# Patient Record
Sex: MALE | Race: OTHER | HISPANIC OR LATINO | URBAN - METROPOLITAN AREA
[De-identification: names, ages, dates, MRNs, and addresses within clinical notes are randomized per-mention and may not be internally consistent; named-entity substitution may affect disease eponyms.]

---

## 2022-04-25 ENCOUNTER — EMERGENCY (EMERGENCY)
Facility: HOSPITAL | Age: 52
LOS: 1 days | Discharge: HOME | End: 2022-04-25
Attending: EMERGENCY MEDICINE
Payer: MEDICAID

## 2022-04-25 VITALS
TEMPERATURE: 99 F | RESPIRATION RATE: 18 BRPM | HEART RATE: 83 BPM | WEIGHT: 159.39 LBS | OXYGEN SATURATION: 97 % | SYSTOLIC BLOOD PRESSURE: 148 MMHG | DIASTOLIC BLOOD PRESSURE: 88 MMHG

## 2022-04-25 PROCEDURE — 73700 CT LOWER EXTREMITY W/O DYE: CPT | Mod: MA

## 2022-04-25 PROCEDURE — 29515 APPLICATION SHORT LEG SPLINT: CPT | Mod: LT

## 2022-04-25 PROCEDURE — 73700 CT LOWER EXTREMITY W/O DYE: CPT | Mod: 26,LT,MA

## 2022-04-25 PROCEDURE — 73630 X-RAY EXAM OF FOOT: CPT

## 2022-04-25 PROCEDURE — 73630 X-RAY EXAM OF FOOT: CPT | Mod: 26,LT

## 2022-04-25 PROCEDURE — 99284 EMERGENCY DEPT VISIT MOD MDM: CPT

## 2022-04-25 PROCEDURE — 99284 EMERGENCY DEPT VISIT MOD MDM: CPT | Mod: 25

## 2022-04-25 RX ORDER — ACETAMINOPHEN 500 MG
975 TABLET ORAL ONCE
Refills: 0 | Status: COMPLETED | OUTPATIENT
Start: 2022-04-25 | End: 2022-04-25

## 2022-04-25 RX ADMIN — Medication 975 MILLIGRAM(S): at 17:21

## 2022-04-25 RX ADMIN — Medication 975 MILLIGRAM(S): at 16:51

## 2022-04-25 NOTE — ED PROVIDER NOTE - PATIENT PORTAL LINK FT
You can access the FollowMyHealth Patient Portal offered by [facility] by registering at the following website: [email or web address]. By joining [employer]’s FollowMyHealth portal, you will also be able to view your health information using other applications (apps) compatible with our system.

## 2022-04-25 NOTE — ED PROVIDER NOTE - NSFOLLOWUPCLINICS_GEN_ALL_ED_FT
[facility]  Podiatry/Wound Care  [address] [place] [address]  Phone: [phone]  Fax: [phone]

## 2022-04-25 NOTE — ED ADULT TRIAGE NOTE - CHIEF COMPLAINT QUOTE
Pt complain of mechanical fall at work on Saturday while going up the stairs, tripped and fell on LT ankle, swelling worsened since fall, denies head injury  Last took motrin at 1100AM today

## 2022-04-25 NOTE — ED PROVIDER NOTE - PHYSICAL EXAMINATION
Left foot - tenderness to anterior medial aspect of left foot. +pedal pulse. +ecchymosis to toes. Swelling to foot. +movement and sensation. Able to ambulate. Left foot - tenderness to anterior medial aspect of left foot. +pedal pulse. +ecchymosis to toes. Swelling to foot. +movement and sensation. Able to ambulate.    PILAR: tenderness to medial aspect of left forefoot. neurovascular and tendon intact distal to injury

## 2022-04-25 NOTE — ED PROVIDER NOTE - ATTENDING APP SHARED VISIT CONTRIBUTION OF CARE
I performed the initial face to face bedside interview with this patient regarding history of present illness, review of symptoms and past medical, social and family history.  I completed an independent physical examination.  I have signed out the follow up of any pending tests (i.e. labs, radiological studies) to the PA/NP.  I have discussed the patient’s plan of care and disposition with the PA/NP

## 2022-04-25 NOTE — ED PROVIDER NOTE - NS ED ATTENDING STATEMENT MOD
This was a shared visit with the DEEPTI. I reviewed and verified the documentation and independently performed the documented:

## 2022-04-25 NOTE — ED ADULT TRIAGE NOTE - NS ED TRIAGE AVPU SCALE
Rashel
Alert-The patient is alert, awake and responds to voice. The patient is oriented to time, place, and person. The triage nurse is able to obtain subjective information.

## 2022-04-25 NOTE — ED PROVIDER NOTE - NSFOLLOWUPINSTRUCTIONS_ED_ALL_ED_FT
Seguimiento con el podólogo dentro de 2 días.    No ponga ningún peso sobre cummins pie en absoluto. Utilice las muletas en todo momento.    Puede asya motrin/tylenol según sea necesario para el dolor.    Si experimenta síntomas nuevos o que empeoran, o si está preocupado, siempre puede regresar a la emergencia para debra reevaluación.    Follow up with the podiatrist within 2 days.     Do no put any weight on your foot at all. Use the crutches at all times.     You can take motrin/tylenol as needed for pain.    If you experience any new or worsening symptoms or if you are concerned you can always come back to the emergency for a re-evaluation. Seguimiento con el podólogo dentro de 2 días.    No ponga ningún peso sobre cummins pie en absoluto. Utilice las muletas en todo momento.    Puede asya motrin/tylenol según sea necesario para el dolor.    Si experimenta síntomas nuevos o que empeoran, o si está preocupado, siempre puede regresar a la emergencia para debra reevaluación.    Follow up with the podiatrist within 2 days.     Do no put any weight on your foot at all. Use the crutches at all times.     You can take motrin/tylenol as needed for pain.    If you experience any new or worsening symptoms or if you are concerned you can always come back to the emergency for a re-evaluation.      Lisfranc Injury       A Lisfranc injury is a break (fracture), separation (dislocation), or sprain involving the bones and joints in the middle of your foot. This is also known as midfoot injury. Your midfoot is made up of a cluster of small bones (tarsals)that attach to the long bones going to your toes (metatarsals).    Strong bands of tissue (ligaments) attach the bones of your midfoot to each other. A Lisfranc injury can include:  •Ligament damage or tears.      •Bone fractures.      •Dislocations.      •A combination of these injuries.      This type of injury can cause foot pain and instability. The condition can range from mild to severe.      What are the causes?    This condition may be caused by:  •An injury that twists your foot forcefully.      •Tripping or falling over your foot.      •Falling from a height.      •A hard, direct hit or a crushing injury to your foot.        What increases the risk?    You are more likely to develop this condition if you participate in:  •Contact sports, especially while wearing cleats. This includes football.      •Activities in which your foot is loaded in a pointed position, like dance and gymnastics.        What are the signs or symptoms?    Symptoms of this condition include:  •Pain.      •Swelling.      •Bruising.      •Seeing or feeling a new bump on the top of your foot.        How is this diagnosed?    This condition is diagnosed based on your symptoms, your medical history, and a physical exam. You may also have imaging studies done, including:  •X-rays.      •CT scans.      •MRI.        How is this treated?    The first treatments for ligament injuries that do not cause instability or dislocation of the midfoot are usually nonsurgical. These may include:  •Using a boot or cast to keep your foot still and protect it while it heals (immobilization).      •Using crutches to keep weight off your foot.      •Doing physical therapy to improve motion and strength.      •Taking medicine for pain.      Surgery is the treatment for fractures, dislocations, and unstable ligament injuries. This may include ligament repair, joint fusion, or a procedure to stabilize the fracture using screws or plates. After surgery, you will have to wear a cast and eventually have physical therapy.      Follow these instructions at home:    Medicines     •Take over-the-counter and prescription medicines only as told by your health care provider.      •Ask your health care provider if the medicine prescribed to you requires you to avoid driving or using heavy machinery.      If you have a boot:     •Wear the boot as told by your health care provider. Remove it only as told by your health care provider.      •Loosen the boot if your toes tingle, become numb, or turn cold and blue.      •Keep the boot clean and dry.      If you have a cast:     • Do not stick anything inside the cast to scratch your skin. Doing that increases your risk of infection.      •Check the skin around the cast every day. Tell your health care provider about any concerns.      •You may put lotion on dry skin around the edges of the cast. Do not put lotion on the skin underneath the cast.      •Keep the cast clean and dry.      • Do not put pressure on any part of the cast until it is fully hardened. This may take several hours.      Bathing     • Do not take baths, swim, or use a hot tub until your health care provider approves.    •If the cast or boot is not waterproof:  •Do not let it get wet.      •Cover it with a watertight covering when you take a bath or shower.          Managing pain, stiffness, and swelling    •If directed, put ice on the injured area.  •If you have a removable boot, remove it as told by your health care provider.      •Put ice in a plastic bag.      •Place a towel between your skin and the bag or between your cast and the bag.      •Leave the ice on for 20 minutes, 2–3 times a day.        •Move your toes often to reduce stiffness and swelling.      •Raise (elevate) the injured area above the level of your heart while you are sitting or lying down.      Activity     • Do not use the injured limb to support your body weight until your health care provider says that you can. Use crutches as told by your health care provider.      •Do exercises as told by your health care provider.      •Return to your normal activities as told by your health care provider. Ask your health care provider what activities are safe for you.      General instructions     •Take over-the-counter and prescription medicines only as told by your health care provider.      •Ask your health care provider when it is safe to drive if you have a boot or cast on your foot.      • Do not use any products that contain nicotine or tobacco, such as cigarettes, e-cigarettes, and chewing tobacco. These can delay bone healing. If you need help quitting, ask your health care provider.      •Keep all follow-up visits as told by your health care provider. This is important.        How is this prevented?    •Warm up and stretch before being active.      •Cool down and stretch after being active.      •Use footwear that is appropriate for your athletic activity and the playing surface.      •Be safe and responsible while being active. This will help you avoid falls.        Contact a health care provider if:    •Your pain medicine and rest are not helping.        Get help right away if:    •Your foot becomes very painful or numb.      •Your toes become very pale or turn blue.        Summary    •A Lisfranc injury is a break (fracture), separation (dislocation), or sprain involving the bones and joints in the middle of your foot. This is also known as midfoot injury.      •You are more likely to develop this condition if you participate in contact sports or activities in which your foot is loaded in a pointed position, like dance and gymnastics.      •This type of injury can cause foot pain and instability. The condition can range from mild to severe.      •This condition is treated with a boot or cast, physical therapy, crutches, medicine for pain, and surgery if needed.      •Get help right away if your foot becomes very painful or numb or if your toes become very pale or turn blue.      This information is not intended to replace advice given to you by your health care provider. Make sure you discuss any questions you have with your health care provider.

## 2022-04-25 NOTE — ED PROVIDER NOTE - PROGRESS NOTE DETAILS
Podiatry called regarding patient. Requesting a CT of foot and they will see patient in clinic. Podiatry called regarding patient. Requesting a CT of foot as they are concerned about a lisfranc fracture and they will see patient in clinic. Xray officially read as a lisfranc fracture. CT result pending, result will not change outcome, just needed for podiatry follow up in office. Patient has informed NP multiple times that he does not want splint or to use the crutches. Patient education multiple times on importance of splint and crutches. Patient has been informed of risk of bearing weight on foot and possible long term effects. Will attempt to splint, give crutch teaching and have patient follow up with podiatry.

## 2022-04-25 NOTE — ED PROVIDER NOTE - CARE PLAN
Principal Discharge DX:	Lisfranc's sprain, left, initial encounter   1 Principal Discharge DX:	Lisfranc dislocation, left, initial encounter

## 2022-04-25 NOTE — ED PROVIDER NOTE - OBJECTIVE STATEMENT
51 year old male presents with left foot pain s/p tripping up the stairs at work on saturday. Ibuprofen taken for pain. Did not hit head. No LOC. Reports swelling and bruising to foot. No numbness or tingling. 51 year old male presents with left foot pain s/p tripping up the stairs at work on saturday. Ibuprofen taken for pain. Did not hit head. No LOC. Reports swelling and bruising to foot. No numbness or tingling.    PILAR: left foot injury

## 2022-04-27 ENCOUNTER — APPOINTMENT (OUTPATIENT)
Dept: PODIATRY | Facility: CLINIC | Age: 52
End: 2022-04-27

## 2022-04-27 ENCOUNTER — TRANSCRIPTION ENCOUNTER (OUTPATIENT)
Age: 52
End: 2022-04-27

## 2022-04-27 ENCOUNTER — OUTPATIENT (OUTPATIENT)
Dept: OUTPATIENT SERVICES | Facility: HOSPITAL | Age: 52
LOS: 1 days | End: 2022-04-27
Payer: SELF-PAY

## 2022-04-27 VITALS
HEART RATE: 95 BPM | DIASTOLIC BLOOD PRESSURE: 88 MMHG | WEIGHT: 165.34 LBS | RESPIRATION RATE: 18 BRPM | TEMPERATURE: 97.8 F | HEIGHT: 62 IN | BODY MASS INDEX: 30.43 KG/M2 | SYSTOLIC BLOOD PRESSURE: 142 MMHG | OXYGEN SATURATION: 99 %

## 2022-04-27 DIAGNOSIS — Z87.898 PERSONAL HISTORY OF OTHER SPECIFIED CONDITIONS: ICD-10-CM

## 2022-04-27 DIAGNOSIS — Z82.62 FAMILY HISTORY OF OSTEOPOROSIS: ICD-10-CM

## 2022-04-27 DIAGNOSIS — Z87.891 PERSONAL HISTORY OF NICOTINE DEPENDENCE: ICD-10-CM

## 2022-04-27 DIAGNOSIS — Z00.00 ENCOUNTER FOR GENERAL ADULT MEDICAL EXAMINATION WITHOUT ABNORMAL FINDINGS: ICD-10-CM

## 2022-04-27 DIAGNOSIS — S92.902A UNSPECIFIED FRACTURE OF LEFT FOOT, INITIAL ENCOUNTER FOR CLOSED FRACTURE: ICD-10-CM

## 2022-04-27 DIAGNOSIS — S99.922A UNSPECIFIED INJURY OF LEFT FOOT, INITIAL ENCOUNTER: ICD-10-CM

## 2022-04-27 PROCEDURE — G0463: CPT

## 2022-04-27 NOTE — ASSESSMENT
[FreeTextEntry1] : Lower Extremity Physical Assessment\par \par Vascular: DP/PT pulses were palpable, b/l. CFT<3 seconds to all digits.Moderate generalized edema noted to the left foot \par Derm: Mild ecchymosis noted to the medial arch of the left foot as well as the dorsal foot. No open lesions noted and no signs of clinical infection noted, b/l\par Neuro: Protective Sensation intact, b/l.\par Muscular: Unable to assess due to pain in the left foot. Was able to wiggle all toes. \par \par Assessment:\par \par Lisfranc injury/fracture/dislocation, left\par \par Plan:\par Patient chart created and examined\par Explained all clinical findings to the patient to the patient's satisfaction.\par ED Xrays and CT scan reviewed\par ED notes reviewed\par Educated patient of signs of clinical infection and report to the ED if any problems arise\par Discussed surgical treatment vs. conservative treatment and potential complications to both types of treatment\par Dressed patient's left lower extremity in a posterior splint and cárdenas compression\par With fracture pattern and mild dislocation, our rec was for ORIF lisfranc dislocation with 2 screw and possible Pin placement.\par patient to be nwb to the left lower extremity. \par Patient to talk to his boss about workers compensation since injury occurred at work\par Patient concerned for caring for himself after surgery as he lives alone on a 2nd floor walk-up\par RTC in 1 week to discuss surgical management vs conservative management

## 2022-04-27 NOTE — HISTORY OF PRESENT ILLNESS
[FreeTextEntry1] : 51 year old male patietn with no significant PMHx presents to clinic for the first time after going to the ED of Monday. 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 States that his foot twisted when he lost balance. States he went to the ED on Monday where they took x-rays, CT, and placed him in a posterior splint with cárdenas compression. States that his pain level is low when in the posterior splint/cárdenas compression and has been NWB since the ED visit. Denies any other pedal complaints. Denies any other constitutional symptoms of N/V/C/F/SOB.

## 2022-04-28 DIAGNOSIS — S93.621A SPRAIN OF TARSOMETATARSAL LIGAMENT OF RIGHT FOOT, INITIAL ENCOUNTER: ICD-10-CM

## 2022-04-28 DIAGNOSIS — S92.322A DISPLACED FRACTURE OF SECOND METATARSAL BONE, LEFT FOOT, INITIAL ENCOUNTER FOR CLOSED FRACTURE: ICD-10-CM

## 2022-04-28 DIAGNOSIS — M79.672 PAIN IN LEFT FOOT: ICD-10-CM

## 2022-05-04 ENCOUNTER — TRANSCRIPTION ENCOUNTER (OUTPATIENT)
Age: 52
End: 2022-05-04

## 2022-05-04 ENCOUNTER — INPATIENT (INPATIENT)
Facility: HOSPITAL | Age: 52
LOS: 4 days | Discharge: HOME | DRG: 505 | End: 2022-05-09
Attending: HOSPITALIST | Admitting: HOSPITALIST
Payer: COMMERCIAL

## 2022-05-04 ENCOUNTER — APPOINTMENT (OUTPATIENT)
Dept: PODIATRY | Facility: CLINIC | Age: 52
End: 2022-05-04

## 2022-05-04 VITALS
TEMPERATURE: 99 F | OXYGEN SATURATION: 98 % | HEART RATE: 87 BPM | RESPIRATION RATE: 18 BRPM | SYSTOLIC BLOOD PRESSURE: 132 MMHG | WEIGHT: 160.06 LBS | DIASTOLIC BLOOD PRESSURE: 83 MMHG

## 2022-05-04 DIAGNOSIS — Z01.818 ENCOUNTER FOR OTHER PREPROCEDURAL EXAMINATION: ICD-10-CM

## 2022-05-04 DIAGNOSIS — S93.325A DISLOCATION OF TARSOMETATARSAL JOINT OF LEFT FOOT, INITIAL ENCOUNTER: ICD-10-CM

## 2022-05-04 DIAGNOSIS — S92.902A UNSPECIFIED FRACTURE OF LEFT FOOT, INITIAL ENCOUNTER FOR CLOSED FRACTURE: ICD-10-CM

## 2022-05-04 DIAGNOSIS — K42.9 UMBILICAL HERNIA WITHOUT OBSTRUCTION OR GANGRENE: ICD-10-CM

## 2022-05-04 LAB
ALBUMIN SERPL ELPH-MCNC: 3.9 G/DL — SIGNIFICANT CHANGE UP (ref 3.5–5)
ALP SERPL-CCNC: 71 U/L — SIGNIFICANT CHANGE UP (ref 40–120)
ALT FLD-CCNC: 40 U/L DA — SIGNIFICANT CHANGE UP (ref 10–60)
ANION GAP SERPL CALC-SCNC: 5 MMOL/L — SIGNIFICANT CHANGE UP (ref 5–17)
APTT BLD: 34.7 SEC — SIGNIFICANT CHANGE UP (ref 27.5–35.5)
AST SERPL-CCNC: 17 U/L — SIGNIFICANT CHANGE UP (ref 10–40)
BASOPHILS # BLD AUTO: 0.01 K/UL — SIGNIFICANT CHANGE UP (ref 0–0.2)
BASOPHILS NFR BLD AUTO: 0.2 % — SIGNIFICANT CHANGE UP (ref 0–2)
BILIRUB SERPL-MCNC: 0.4 MG/DL — SIGNIFICANT CHANGE UP (ref 0.2–1.2)
BLD GP AB SCN SERPL QL: SIGNIFICANT CHANGE UP
BUN SERPL-MCNC: 13 MG/DL — SIGNIFICANT CHANGE UP (ref 7–18)
CALCIUM SERPL-MCNC: 9.1 MG/DL — SIGNIFICANT CHANGE UP (ref 8.4–10.5)
CHLORIDE SERPL-SCNC: 106 MMOL/L — SIGNIFICANT CHANGE UP (ref 96–108)
CO2 SERPL-SCNC: 29 MMOL/L — SIGNIFICANT CHANGE UP (ref 22–31)
CREAT SERPL-MCNC: 0.8 MG/DL — SIGNIFICANT CHANGE UP (ref 0.5–1.3)
EGFR: 107 ML/MIN/1.73M2 — SIGNIFICANT CHANGE UP
EOSINOPHIL # BLD AUTO: 0.05 K/UL — SIGNIFICANT CHANGE UP (ref 0–0.5)
EOSINOPHIL NFR BLD AUTO: 0.8 % — SIGNIFICANT CHANGE UP (ref 0–6)
GLUCOSE SERPL-MCNC: 100 MG/DL — HIGH (ref 70–99)
HCT VFR BLD CALC: 41.3 % — SIGNIFICANT CHANGE UP (ref 39–50)
HGB BLD-MCNC: 14.6 G/DL — SIGNIFICANT CHANGE UP (ref 13–17)
IMM GRANULOCYTES NFR BLD AUTO: 0.3 % — SIGNIFICANT CHANGE UP (ref 0–1.5)
INR BLD: 1.02 RATIO — SIGNIFICANT CHANGE UP (ref 0.88–1.16)
LIDOCAIN IGE QN: 127 U/L — SIGNIFICANT CHANGE UP (ref 73–393)
LYMPHOCYTES # BLD AUTO: 1.97 K/UL — SIGNIFICANT CHANGE UP (ref 1–3.3)
LYMPHOCYTES # BLD AUTO: 30.8 % — SIGNIFICANT CHANGE UP (ref 13–44)
MCHC RBC-ENTMCNC: 29.9 PG — SIGNIFICANT CHANGE UP (ref 27–34)
MCHC RBC-ENTMCNC: 35.4 GM/DL — SIGNIFICANT CHANGE UP (ref 32–36)
MCV RBC AUTO: 84.6 FL — SIGNIFICANT CHANGE UP (ref 80–100)
MONOCYTES # BLD AUTO: 0.49 K/UL — SIGNIFICANT CHANGE UP (ref 0–0.9)
MONOCYTES NFR BLD AUTO: 7.7 % — SIGNIFICANT CHANGE UP (ref 2–14)
NEUTROPHILS # BLD AUTO: 3.85 K/UL — SIGNIFICANT CHANGE UP (ref 1.8–7.4)
NEUTROPHILS NFR BLD AUTO: 60.2 % — SIGNIFICANT CHANGE UP (ref 43–77)
NRBC # BLD: 0 /100 WBCS — SIGNIFICANT CHANGE UP (ref 0–0)
PLATELET # BLD AUTO: 361 K/UL — SIGNIFICANT CHANGE UP (ref 150–400)
POTASSIUM SERPL-MCNC: 3.6 MMOL/L — SIGNIFICANT CHANGE UP (ref 3.5–5.3)
POTASSIUM SERPL-SCNC: 3.6 MMOL/L — SIGNIFICANT CHANGE UP (ref 3.5–5.3)
PROT SERPL-MCNC: 7.4 G/DL — SIGNIFICANT CHANGE UP (ref 6–8.3)
PROTHROM AB SERPL-ACNC: 12.1 SEC — SIGNIFICANT CHANGE UP (ref 10.5–13.4)
RBC # BLD: 4.88 M/UL — SIGNIFICANT CHANGE UP (ref 4.2–5.8)
RBC # FLD: 12.6 % — SIGNIFICANT CHANGE UP (ref 10.3–14.5)
SARS-COV-2 RNA SPEC QL NAA+PROBE: SIGNIFICANT CHANGE UP
SODIUM SERPL-SCNC: 140 MMOL/L — SIGNIFICANT CHANGE UP (ref 135–145)
WBC # BLD: 6.39 K/UL — SIGNIFICANT CHANGE UP (ref 3.8–10.5)
WBC # FLD AUTO: 6.39 K/UL — SIGNIFICANT CHANGE UP (ref 3.8–10.5)

## 2022-05-04 PROCEDURE — 73630 X-RAY EXAM OF FOOT: CPT | Mod: 26,LT

## 2022-05-04 PROCEDURE — 71045 X-RAY EXAM CHEST 1 VIEW: CPT | Mod: 26

## 2022-05-04 PROCEDURE — 99285 EMERGENCY DEPT VISIT HI MDM: CPT

## 2022-05-04 PROCEDURE — 99222 1ST HOSP IP/OBS MODERATE 55: CPT | Mod: GC

## 2022-05-04 RX ORDER — KETOROLAC TROMETHAMINE 30 MG/ML
15 SYRINGE (ML) INJECTION EVERY 4 HOURS
Refills: 0 | Status: DISCONTINUED | OUTPATIENT
Start: 2022-05-04 | End: 2022-05-05

## 2022-05-04 RX ORDER — ACETAMINOPHEN 500 MG
325 TABLET ORAL EVERY 4 HOURS
Refills: 0 | Status: DISCONTINUED | OUTPATIENT
Start: 2022-05-04 | End: 2022-05-05

## 2022-05-04 NOTE — H&P ADULT - ASSESSMENT
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 He has no significant medical history. Admitted for medical optimization for ORIF for L foot fracture scheduled on 5/5/2022.

## 2022-05-04 NOTE — H&P ADULT - NSHPPHYSICALEXAM_GEN_ALL_CORE
Vital Signs  · Temp - 99.3F (37.4C)  · Heart Rate - 87  · BP - 132/83  · Respiration Rate - 18  · SpO2 (%) - 98    PHYSICAL EXAM:  GENERAL: NAD, speaks in full sentences, no signs of respiratory distress  HEAD:  Atraumatic, Normocephalic  EYES: EOMI, PERRLA, conjunctiva and sclera clear  NECK: Supple, No JVD  CHEST/LUNG: Clear to auscultation bilaterally; No wheeze; No crackles; No accessory muscles used  HEART: Regular rate and rhythm; No murmurs;   ABDOMEN: Soft, Nontender, Nondistended; Bowel sounds present; No guarding  EXTREMITIES:  2+ Peripheral Pulses, No cyanosis or edema; (+) L foot cast/splint  PSYCH: AAOx3  NEUROLOGY: non-focal  SKIN: No rashes or lesions Vital Signs  · Temp - 99.3F (37.4C)  · Heart Rate - 87  · BP - 132/83  · Respiration Rate - 18  · SpO2 (%) - 98    PHYSICAL EXAM:  GENERAL: NAD, speaks in full sentences, no signs of respiratory distress  HEAD:  Atraumatic, Normocephalic  EYES: EOMI, PERRLA, conjunctiva and sclera clear  NECK: Supple, No JVD  CHEST/LUNG: Clear to auscultation bilaterally; No wheeze; No crackles; No accessory muscles used  HEART: Regular rate and rhythm; No murmurs;   ABDOMEN: Soft, Nontender, (+) umbilical hernia, Nondistended; Bowel sounds present; No guarding  EXTREMITIES:  2+ Peripheral Pulses, No cyanosis or edema; (+) L foot cast/splint  PSYCH: AAOx3  NEUROLOGY: non-focal  SKIN: No rashes or lesions

## 2022-05-04 NOTE — PATIENT PROFILE ADULT - FALL HARM RISK - HARM RISK INTERVENTIONS
Assistance with ambulation/Assistance OOB with selected safe patient handling equipment/Communicate Risk of Fall with Harm to all staff/Discuss with provider need for PT consult/Monitor gait and stability/Provide patient with walking aids - walker, cane, crutches/Reinforce activity limits and safety measures with patient and family/Tailored Fall Risk Interventions/Use of alarms - bed, chair and/or voice tab/Visual Cue: Yellow wristband and red socks/Bed in lowest position, wheels locked, appropriate side rails in place/Call bell, personal items and telephone in reach/Instruct patient to call for assistance before getting out of bed or chair/Non-slip footwear when patient is out of bed/[place] to call system/Physically safe environment - no spills, clutter or unnecessary equipment/Purposeful Proactive Rounding/Room/bathroom lighting operational, light cord in reach

## 2022-05-04 NOTE — ED ADULT TRIAGE NOTE - INTERNATIONAL TRAVEL
Your opinion matters! Thank you for choosing Dr. Michell Nogueira at [facility]. You may receive a survey in the mail about today's visit.  We always appreciate feedback.  It was a pleasure to care for you today!    Dr. Nogueira's Staff:    Jeimy is the   Kristina is the Medical Assistant  Arabella is the Nurse   No

## 2022-05-04 NOTE — CONSULT NOTE ADULT - ATTENDING COMMENTS
Seen at bedside in ED.  Plan of ORIF tomorrow.  Please medically optimize.  Procedure discussed with patient.

## 2022-05-04 NOTE — H&P ADULT - ATTENDING COMMENTS
52yo M no PMHx who was sent to the ED by his podiatrist for operative repair of left foot fracture. 2 weeks ago patient reports falling and twisting his ankle at work. Presented to the ED and had a splint placed. Patient followed up with podiatry and was subsequently referred to the ED. CT L Foot at the time showed Lisfranc displacement. Patient denies chest pain, shortness of breath. Reports he has umbilical hernia, but has not seen a doctor about it yet.    #Lisfranc Fracture  #Pre-operative Evaluation  #Umbilical Hernia, reducible    -plan for ORIF with podiatry tomorrow  -EKG with normal sinus rhythm  -patient has no risk factors for CAD, has good functional status (>4 METs) no signs of acute coronary syndrome or decompensated heart failure. Patient is a low-risk for a low risk procedure. May proceed without further cardiac testing  -Patient medically optimized for surgical repair of fracture  -PT consult after podiatry surgery for disposition  -Surgery referral as outpatient for repair of umbilical hernia 50yo M no PMHx who was sent to the ED by his podiatrist for operative repair of left foot fracture. 2 weeks ago patient reports falling and twisting his ankle at work. Presented to the ED and had a splint placed. Patient followed up with podiatry and was subsequently referred to the ED. CT L Foot at the time showed Lisfranc displacement. Patient denies chest pain, shortness of breath. Reports he has umbilical hernia, but has not seen a doctor about it yet.    #Lisfranc Fracture  #Pre-operative Evaluation  #Umbilical Hernia, reducible    -plan for ORIF with podiatry tomorrow, NPO after midnight  -EKG with normal sinus rhythm  -Tylenol and Toradol for pain, may need to adjust post-operatively  -patient has no risk factors for CAD, has good functional status (>4 METs) no signs of acute coronary syndrome or decompensated heart failure. Patient is a low-risk for a low risk procedure. May proceed without further cardiac testing  -Patient medically optimized for surgical repair of fracture  -PT consult after podiatry surgery for disposition  -Surgery referral as outpatient for repair of umbilical hernia

## 2022-05-04 NOTE — H&P ADULT - PROBLEM SELECTOR PLAN 3
- no significant PMHx  - vital signs stable on admission  - scheduled for ORIF of Left Lisfranc Fracture Surgery tomorrow (5/5) at 3pm with Dr. Pacheco  - patient has no risk factors for coronary artery disease  - Good functional capacity (METS 4), no exertional chest pain or shortness of breath  - EKG findings showed NSR, possible LAE (borderline EKG)  - José Perioperative Risk - 0.0%  - RCRI score: 0 (Class I Risk)  - 3.9% cardiac risk for surgery  - medically optimized for the procedure

## 2022-05-04 NOTE — H&P ADULT - HISTORY OF PRESENT ILLNESS
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 He has no significant medical history. 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 He broke his fall by grabbing on the railing. Initially, he just presented with limping with no pain or swelling. He started to have intermittent dull pain at night. Three days prior he started to notice increased swelling of his L foot with associated darkening of the skin overlying his toes. He still continued to work and denies any numbness, tingling sensation. When he noticed that the swelling did not resolved, he consulted ED who referred him to Podiatry. XR L foot showed a Lisfranc fracture (transverse displaced fracture at the 2nd metatarsal bone base). A Day compression / posterior split was placed. CT L foot showed comminuted fracture at the base of the 2nd metatarsal, dorsal subluxation, avulsion fracture, 1st tarsometatarsal, 3rd tarsometatarsal, 4fth tarsometatarsal mild lateral subluxation of the 1st metatarsal and middle cuneiform. He was subsequently admitted for medical optimization for ORIF scheduled on 5/5/2022. 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 He has no significant medical history. 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 He broke his fall by grabbing on the railing. Initially, he just presented with limping with no pain or swelling. He started to have intermittent dull pain at night. Three days prior he started to notice increased swelling of his L foot with associated darkening of the skin overlying his toes. He still continued to work and denies any numbness, tingling sensation. When he noticed that the swelling did not resolved, he consulted ED who referred him to Podiatry. XR L foot showed a Lisfranc fracture (transverse displaced fracture at the 2nd metatarsal bone base). A Day compression / posterior split was placed. CT L foot showed comminuted fracture at the base of the 2nd metatarsal, dorsal subluxation, avulsion fracture, 1st tarsometatarsal, 3rd tarsometatarsal, 4fth tarsometatarsal mild lateral subluxation of the 1st metatarsal and middle cuneiform. He was subsequently admitted for ORIF scheduled on 5/5/2022.

## 2022-05-04 NOTE — H&P ADULT - NSHPREVIEWOFSYSTEMS_GEN_ALL_CORE
- CONSTITUTIONAL: Denies fever and chills  - HEENT: Denies changes in vision and hearing.  - RESPIRATORY: Denies SOB and cough.  - CV: Denies chest pain and palpitations  - GI: Denies abdominal pain, nausea, vomiting and diarrhea.  - : Denies dysuria and urinary frequency.  - SKIN: Denies rash and pruritus.  - NEUROLOGICAL: Denies headache and syncope.  - EXTREMITIES: (+) L foot pain/swelling  - PSYCHIATRIC: Denies recent changes in mood. Denies anxiety and depression.

## 2022-05-04 NOTE — H&P ADULT - PROBLEM SELECTOR PLAN 1
p/w L foot pain  Hx of mechanical fall  XR L foot - Lisfranc fracture w/ transverse discplaced fracture of 2nd MT  CT L foot - comminuted fracture at base of 2nd MT, dorsal subluxation; avulsion fracture, 1st, 3rd, 4th TMT, mild lateral subluxation, 1st MT, middle cuneiform  s/p Jeffery compression / posterior splint placed  pain meds x prn  Fall precaution  Podiatry consulted  Scheduled for ORIF (5/5/22)  Pt consulted p/w L foot pain  Hx of mechanical fall  XR L foot - Lisfranc fracture w/ transverse displaced fracture of 2nd MT  CT L foot - comminuted fracture at base of 2nd MT, dorsal subluxation; avulsion fracture, 1st, 3rd, 4th TMT, mild lateral subluxation, 1st MT, middle cuneiform  s/p Jeffery compression / posterior splint placed  pain meds x prn  Fall precaution  Podiatry consulted  Scheduled for ORIF (5/5/22)  Pt consulted

## 2022-05-04 NOTE — ED ADULT TRIAGE NOTE - DIRECT TO ROOM CARE INITIATED:
Diabetes education. Dandy 670G downloaded/ scanned to Epic. Struggling with meal clearance and lows during the night and 4-5 hourspp. Basal rates 12am 1.1 units  --decrease 1.0 units                       5am 1.3 units  -- decrease 1.2 units                      7pm 1.5  Units                       10pm 1.3 units  Carb ratios 12am 7grams                      6:30am 4.5 grams  --increase 4 grams                        5pm 4.0 grams   --increase 3.5 grams                      10pm 7 grams  --increase 6.5 grams    Please authorize the Diabetes Clinic at [facility]. oJhana's to teach/ assist Crystal to make th insulin pump setting changes as noted above. If you agree, please note and retrun. Thank you. 04-May-2022 09:54

## 2022-05-04 NOTE — H&P ADULT - PROBLEM SELECTOR PLAN 2
- no significant PMHx  - vital signs stable on admission  - scheduled for ORIF of Left Lisfranc Fracture Surgery tomorrow (5/5) at 3pm with Dr. Pacheco  - patient has no risk factors for coronary artery disease  - Good functional capacity (METS 4), no exertional chest pain or shortness of breath  - EKG findings showed NSR, possible LAE (borderline EKG)  - José Perioperative Risk - 0.0%  - RCRI score: 0 (Class I Risk)  - 3.9% cardiac risk for surgery  - medically optimized for the procedure mildly tender reducible umbilical hernia  no discharge, change in color  advised to observe for any worsening tenderness, non-reducibility or change in color  ff-up as outpatient with General Surgery

## 2022-05-04 NOTE — ED PROVIDER NOTE - CLINICAL SUMMARY MEDICAL DECISION MAKING FREE TEXT BOX
50 yo male here for OR repair of left foot injury suffered 8 days ago. will perform preoperative w/o and admit to medical service.

## 2022-05-04 NOTE — ED PROVIDER NOTE - OBJECTIVE STATEMENT
52 yo male here for surgery to left foot. Suffered Lisfranc injury to left foot 8 days ago going down stairs. C/o pain and swelling to foot. Denies any weakness or numbness. not on any meds. Denies any med allergies.

## 2022-05-04 NOTE — PATIENT PROFILE ADULT - FUNCTIONAL ASSESSMENT - DAILY ACTIVITY 5.
Spoke with Lolis at [facility] and patient will plan to discharge this afternoon to Nursing Home at 1500.   4 = No assist / stand by assistance

## 2022-05-05 ENCOUNTER — TRANSCRIPTION ENCOUNTER (OUTPATIENT)
Age: 52
End: 2022-05-05

## 2022-05-05 LAB
ALBUMIN SERPL ELPH-MCNC: 3.7 G/DL — SIGNIFICANT CHANGE UP (ref 3.5–5)
ALP SERPL-CCNC: 68 U/L — SIGNIFICANT CHANGE UP (ref 40–120)
ALT FLD-CCNC: 37 U/L DA — SIGNIFICANT CHANGE UP (ref 10–60)
ANION GAP SERPL CALC-SCNC: 8 MMOL/L — SIGNIFICANT CHANGE UP (ref 5–17)
APTT BLD: 33.4 SEC — SIGNIFICANT CHANGE UP (ref 27.5–35.5)
AST SERPL-CCNC: 18 U/L — SIGNIFICANT CHANGE UP (ref 10–40)
BASOPHILS # BLD AUTO: 0.02 K/UL — SIGNIFICANT CHANGE UP (ref 0–0.2)
BASOPHILS NFR BLD AUTO: 0.3 % — SIGNIFICANT CHANGE UP (ref 0–2)
BILIRUB SERPL-MCNC: 0.5 MG/DL — SIGNIFICANT CHANGE UP (ref 0.2–1.2)
BUN SERPL-MCNC: 15 MG/DL — SIGNIFICANT CHANGE UP (ref 7–18)
CALCIUM SERPL-MCNC: 9.4 MG/DL — SIGNIFICANT CHANGE UP (ref 8.4–10.5)
CHLORIDE SERPL-SCNC: 104 MMOL/L — SIGNIFICANT CHANGE UP (ref 96–108)
CO2 SERPL-SCNC: 26 MMOL/L — SIGNIFICANT CHANGE UP (ref 22–31)
CREAT SERPL-MCNC: 0.78 MG/DL — SIGNIFICANT CHANGE UP (ref 0.5–1.3)
EGFR: 108 ML/MIN/1.73M2 — SIGNIFICANT CHANGE UP
EOSINOPHIL # BLD AUTO: 0.11 K/UL — SIGNIFICANT CHANGE UP (ref 0–0.5)
EOSINOPHIL NFR BLD AUTO: 1.6 % — SIGNIFICANT CHANGE UP (ref 0–6)
GLUCOSE SERPL-MCNC: 108 MG/DL — HIGH (ref 70–99)
HCT VFR BLD CALC: 41.9 % — SIGNIFICANT CHANGE UP (ref 39–50)
HGB BLD-MCNC: 14.6 G/DL — SIGNIFICANT CHANGE UP (ref 13–17)
IMM GRANULOCYTES NFR BLD AUTO: 0.3 % — SIGNIFICANT CHANGE UP (ref 0–1.5)
INR BLD: 1 RATIO — SIGNIFICANT CHANGE UP (ref 0.88–1.16)
LYMPHOCYTES # BLD AUTO: 2.35 K/UL — SIGNIFICANT CHANGE UP (ref 1–3.3)
LYMPHOCYTES # BLD AUTO: 35 % — SIGNIFICANT CHANGE UP (ref 13–44)
MAGNESIUM SERPL-MCNC: 2.2 MG/DL — SIGNIFICANT CHANGE UP (ref 1.6–2.6)
MCHC RBC-ENTMCNC: 29.4 PG — SIGNIFICANT CHANGE UP (ref 27–34)
MCHC RBC-ENTMCNC: 34.8 GM/DL — SIGNIFICANT CHANGE UP (ref 32–36)
MCV RBC AUTO: 84.3 FL — SIGNIFICANT CHANGE UP (ref 80–100)
MONOCYTES # BLD AUTO: 0.57 K/UL — SIGNIFICANT CHANGE UP (ref 0–0.9)
MONOCYTES NFR BLD AUTO: 8.5 % — SIGNIFICANT CHANGE UP (ref 2–14)
NEUTROPHILS # BLD AUTO: 3.64 K/UL — SIGNIFICANT CHANGE UP (ref 1.8–7.4)
NEUTROPHILS NFR BLD AUTO: 54.3 % — SIGNIFICANT CHANGE UP (ref 43–77)
NRBC # BLD: 0 /100 WBCS — SIGNIFICANT CHANGE UP (ref 0–0)
PHOSPHATE SERPL-MCNC: 3 MG/DL — SIGNIFICANT CHANGE UP (ref 2.5–4.5)
PLATELET # BLD AUTO: 371 K/UL — SIGNIFICANT CHANGE UP (ref 150–400)
POTASSIUM SERPL-MCNC: 3.6 MMOL/L — SIGNIFICANT CHANGE UP (ref 3.5–5.3)
POTASSIUM SERPL-SCNC: 3.6 MMOL/L — SIGNIFICANT CHANGE UP (ref 3.5–5.3)
PROT SERPL-MCNC: 7.3 G/DL — SIGNIFICANT CHANGE UP (ref 6–8.3)
PROTHROM AB SERPL-ACNC: 11.9 SEC — SIGNIFICANT CHANGE UP (ref 10.5–13.4)
RBC # BLD: 4.97 M/UL — SIGNIFICANT CHANGE UP (ref 4.2–5.8)
RBC # FLD: 12.5 % — SIGNIFICANT CHANGE UP (ref 10.3–14.5)
SODIUM SERPL-SCNC: 138 MMOL/L — SIGNIFICANT CHANGE UP (ref 135–145)
WBC # BLD: 6.71 K/UL — SIGNIFICANT CHANGE UP (ref 3.8–10.5)
WBC # FLD AUTO: 6.71 K/UL — SIGNIFICANT CHANGE UP (ref 3.8–10.5)

## 2022-05-05 PROCEDURE — 99233 SBSQ HOSP IP/OBS HIGH 50: CPT | Mod: GC

## 2022-05-05 PROCEDURE — 73630 X-RAY EXAM OF FOOT: CPT | Mod: 26,LT

## 2022-05-05 DEVICE — IMPLANTABLE DEVICE: Type: IMPLANTABLE DEVICE | Status: FUNCTIONAL

## 2022-05-05 DEVICE — K WIRE 1.6 X 150MM: Type: IMPLANTABLE DEVICE | Status: FUNCTIONAL

## 2022-05-05 RX ORDER — FENTANYL CITRATE 50 UG/ML
25 INJECTION INTRAVENOUS
Refills: 0 | Status: DISCONTINUED | OUTPATIENT
Start: 2022-05-05 | End: 2022-05-05

## 2022-05-05 RX ORDER — ACETAMINOPHEN 500 MG
325 TABLET ORAL EVERY 4 HOURS
Refills: 0 | Status: DISCONTINUED | OUTPATIENT
Start: 2022-05-05 | End: 2022-05-06

## 2022-05-05 RX ORDER — HYDROMORPHONE HYDROCHLORIDE 2 MG/ML
0.5 INJECTION INTRAMUSCULAR; INTRAVENOUS; SUBCUTANEOUS
Refills: 0 | Status: DISCONTINUED | OUTPATIENT
Start: 2022-05-05 | End: 2022-05-05

## 2022-05-05 RX ORDER — ONDANSETRON 8 MG/1
4 TABLET, FILM COATED ORAL ONCE
Refills: 0 | Status: DISCONTINUED | OUTPATIENT
Start: 2022-05-05 | End: 2022-05-05

## 2022-05-05 RX ADMIN — Medication 325 MILLIGRAM(S): at 22:20

## 2022-05-05 RX ADMIN — Medication 325 MILLIGRAM(S): at 23:00

## 2022-05-05 RX ADMIN — HYDROMORPHONE HYDROCHLORIDE 0.5 MILLIGRAM(S): 2 INJECTION INTRAMUSCULAR; INTRAVENOUS; SUBCUTANEOUS at 18:41

## 2022-05-05 NOTE — BRIEF OPERATIVE NOTE - NSICDXBRIEFPROCEDURE_GEN_ALL_CORE_FT
PROCEDURES:  Open reduction and internal fixation (ORIF) of Lisfranc injury of left foot 05-May-2022 18:12:29  Vandana Krishnan

## 2022-05-06 DIAGNOSIS — M79.672 PAIN IN LEFT FOOT: ICD-10-CM

## 2022-05-06 DIAGNOSIS — M79.671 PAIN IN RIGHT FOOT: ICD-10-CM

## 2022-05-06 LAB
ANION GAP SERPL CALC-SCNC: 7 MMOL/L — SIGNIFICANT CHANGE UP (ref 5–17)
BUN SERPL-MCNC: 13 MG/DL — SIGNIFICANT CHANGE UP (ref 7–18)
CALCIUM SERPL-MCNC: 9.3 MG/DL — SIGNIFICANT CHANGE UP (ref 8.4–10.5)
CHLORIDE SERPL-SCNC: 105 MMOL/L — SIGNIFICANT CHANGE UP (ref 96–108)
CO2 SERPL-SCNC: 28 MMOL/L — SIGNIFICANT CHANGE UP (ref 22–31)
CREAT SERPL-MCNC: 0.76 MG/DL — SIGNIFICANT CHANGE UP (ref 0.5–1.3)
EGFR: 109 ML/MIN/1.73M2 — SIGNIFICANT CHANGE UP
GLUCOSE SERPL-MCNC: 107 MG/DL — HIGH (ref 70–99)
HCT VFR BLD CALC: 40.1 % — SIGNIFICANT CHANGE UP (ref 39–50)
HGB BLD-MCNC: 13.9 G/DL — SIGNIFICANT CHANGE UP (ref 13–17)
MCHC RBC-ENTMCNC: 29.7 PG — SIGNIFICANT CHANGE UP (ref 27–34)
MCHC RBC-ENTMCNC: 34.7 GM/DL — SIGNIFICANT CHANGE UP (ref 32–36)
MCV RBC AUTO: 85.7 FL — SIGNIFICANT CHANGE UP (ref 80–100)
NRBC # BLD: 0 /100 WBCS — SIGNIFICANT CHANGE UP (ref 0–0)
PLATELET # BLD AUTO: 338 K/UL — SIGNIFICANT CHANGE UP (ref 150–400)
POTASSIUM SERPL-MCNC: 3.9 MMOL/L — SIGNIFICANT CHANGE UP (ref 3.5–5.3)
POTASSIUM SERPL-SCNC: 3.9 MMOL/L — SIGNIFICANT CHANGE UP (ref 3.5–5.3)
RBC # BLD: 4.68 M/UL — SIGNIFICANT CHANGE UP (ref 4.2–5.8)
RBC # FLD: 12.7 % — SIGNIFICANT CHANGE UP (ref 10.3–14.5)
SODIUM SERPL-SCNC: 140 MMOL/L — SIGNIFICANT CHANGE UP (ref 135–145)
WBC # BLD: 12.86 K/UL — HIGH (ref 3.8–10.5)
WBC # FLD AUTO: 12.86 K/UL — HIGH (ref 3.8–10.5)

## 2022-05-06 PROCEDURE — 99222 1ST HOSP IP/OBS MODERATE 55: CPT

## 2022-05-06 PROCEDURE — 99233 SBSQ HOSP IP/OBS HIGH 50: CPT | Mod: GC

## 2022-05-06 RX ORDER — OXYCODONE HYDROCHLORIDE 5 MG/1
10 TABLET ORAL EVERY 6 HOURS
Refills: 0 | Status: DISCONTINUED | OUTPATIENT
Start: 2022-05-06 | End: 2022-05-08

## 2022-05-06 RX ORDER — MORPHINE SULFATE 50 MG/1
1 CAPSULE, EXTENDED RELEASE ORAL EVERY 4 HOURS
Refills: 0 | Status: DISCONTINUED | OUTPATIENT
Start: 2022-05-06 | End: 2022-05-06

## 2022-05-06 RX ORDER — TRAMADOL HYDROCHLORIDE 50 MG/1
25 TABLET ORAL ONCE
Refills: 0 | Status: DISCONTINUED | OUTPATIENT
Start: 2022-05-06 | End: 2022-05-06

## 2022-05-06 RX ORDER — POLYETHYLENE GLYCOL 3350 17 G/17G
17 POWDER, FOR SOLUTION ORAL DAILY
Refills: 0 | Status: DISCONTINUED | OUTPATIENT
Start: 2022-05-06 | End: 2022-05-09

## 2022-05-06 RX ORDER — KETOROLAC TROMETHAMINE 30 MG/ML
30 SYRINGE (ML) INJECTION EVERY 6 HOURS
Refills: 0 | Status: DISCONTINUED | OUTPATIENT
Start: 2022-05-06 | End: 2022-05-09

## 2022-05-06 RX ORDER — POLYETHYLENE GLYCOL 3350 17 G/17G
17 POWDER, FOR SOLUTION ORAL
Refills: 0 | Status: DISCONTINUED | OUTPATIENT
Start: 2022-05-06 | End: 2022-05-06

## 2022-05-06 RX ORDER — ACETAMINOPHEN 500 MG
1000 TABLET ORAL EVERY 8 HOURS
Refills: 0 | Status: COMPLETED | OUTPATIENT
Start: 2022-05-06 | End: 2022-05-07

## 2022-05-06 RX ORDER — SENNA PLUS 8.6 MG/1
2 TABLET ORAL AT BEDTIME
Refills: 0 | Status: DISCONTINUED | OUTPATIENT
Start: 2022-05-06 | End: 2022-05-09

## 2022-05-06 RX ORDER — ENOXAPARIN SODIUM 100 MG/ML
40 INJECTION SUBCUTANEOUS EVERY 24 HOURS
Refills: 0 | Status: DISCONTINUED | OUTPATIENT
Start: 2022-05-06 | End: 2022-05-09

## 2022-05-06 RX ADMIN — MORPHINE SULFATE 1 MILLIGRAM(S): 50 CAPSULE, EXTENDED RELEASE ORAL at 19:00

## 2022-05-06 RX ADMIN — OXYCODONE HYDROCHLORIDE 10 MILLIGRAM(S): 5 TABLET ORAL at 11:21

## 2022-05-06 RX ADMIN — MORPHINE SULFATE 1 MILLIGRAM(S): 50 CAPSULE, EXTENDED RELEASE ORAL at 18:28

## 2022-05-06 RX ADMIN — Medication 325 MILLIGRAM(S): at 06:33

## 2022-05-06 RX ADMIN — Medication 1000 MILLIGRAM(S): at 22:54

## 2022-05-06 RX ADMIN — Medication 325 MILLIGRAM(S): at 05:31

## 2022-05-06 RX ADMIN — TRAMADOL HYDROCHLORIDE 25 MILLIGRAM(S): 50 TABLET ORAL at 11:22

## 2022-05-06 RX ADMIN — OXYCODONE HYDROCHLORIDE 10 MILLIGRAM(S): 5 TABLET ORAL at 13:00

## 2022-05-06 RX ADMIN — SENNA PLUS 2 TABLET(S): 8.6 TABLET ORAL at 22:55

## 2022-05-06 RX ADMIN — ENOXAPARIN SODIUM 40 MILLIGRAM(S): 100 INJECTION SUBCUTANEOUS at 22:55

## 2022-05-06 RX ADMIN — TRAMADOL HYDROCHLORIDE 25 MILLIGRAM(S): 50 TABLET ORAL at 10:25

## 2022-05-06 NOTE — PHYSICAL THERAPY INITIAL EVALUATION ADULT - GENERAL OBSERVATIONS, REHAB EVAL
Pt seen supine in bed w/left posterior splint, bandage in place c/o severe left leg pain despite pain meds-RN aware

## 2022-05-06 NOTE — PHYSICAL THERAPY INITIAL EVALUATION ADULT - CRITERIA FOR SKILLED THERAPEUTIC INTERVENTIONS
home with home PT (pending stair negotiation ability-unable to assess due to c/o severe leg pain)/impairments found/functional limitations in following categories/risk reduction/prevention/anticipated discharge recommendation

## 2022-05-06 NOTE — CONSULT NOTE ADULT - ASSESSMENT
A:   Left Foot Lisfranc Fracture    P:  Patient evaluated, chart reviewed  Explained all clinical findings to the patient to the patient's satisfaction  Previous Xrays reviewed  NEW Xrays ordered  Request CT order W/WO constrast of Left Foot per ED/Medicine Team.   Dressed patient in a Day Compression and Posterior splint  Podiatry Plan  -Patient will have ORIF of Left Lisfranc Fracture Surgery tomorrow (5/5) at 3pm with Dr. Pacheco  -Request Medical Optimization per medicine Team  -NPO after midnight  -Covid ordered  Discussed with Attending Dr. Pacheco and seen bedside with Dr. Garcia  
Prescription Monitoring Program Registry  Welcome Malaika Farmer  ×  Update Personal Info  FAQ  Search Results Help  Help  ×An MME Calculator is now available by clicking the MME Calculator tab on the purple task bar. An informational sheet can be found by clicking ‘MME Calculator Help’ on the MME Calculator page. This informational tool is a resource only and is not meant for direct clinical application.   Patient Search   Multi-Patient Search   MME Calculator   Reports   Drug List   Designation   My LIZETH #  Data Detail Level: Printer-Friendly View Extended View  Confidential Drug Utilization Report  Search Terms: valentine Bethea, 1970Search Date: 05/06/2022 22:54:27 PM  The Drug Utilization Report below displays all of the controlled substance prescriptions, if any, that your patient has filled in the last twelve months. The information displayed on this report is compiled from pharmacy submissions to the Department, and accurately reflects the information as submitted by the pharmacies.    This report was requested by: Malaika Farmer | Reference #: [id number]    There are no results for the search terms that you entered

## 2022-05-06 NOTE — PROGRESS NOTE ADULT - PROBLEM SELECTOR PLAN 3
- no significant PMHx  - vital signs stable on admission  - scheduled for ORIF of Left Lisfranc Fracture Surgery tomorrow (5/5) at 3pm with Dr. Pacheco  - patient has no risk factors for coronary artery disease  - Good functional capacity (METS 4), no exertional chest pain or shortness of breath  - EKG findings showed NSR, possible LAE (borderline EKG)  - José Perioperative Risk - 0.0%  - RCRI score: 0 (Class I Risk)  - 3.9% cardiac risk for surgery  - medically optimized for the procedure
- no significant PMHx  - vital signs stable on admission  - scheduled for ORIF of Left Lisfranc Fracture Surgery tomorrow (5/5) at 3pm with Dr. Pacheco  - patient has no risk factors for coronary artery disease  - Good functional capacity (METS 4), no exertional chest pain or shortness of breath  - EKG findings showed NSR, possible LAE (borderline EKG)  - José Perioperative Risk - 0.0%  - RCRI score: 0 (Class I Risk)  - 3.9% cardiac risk for surgery  - medically optimized for the procedure

## 2022-05-06 NOTE — PROGRESS NOTE ADULT - PROBLEM SELECTOR PLAN 2
mildly tender reducible umbilical hernia  no discharge, change in color  advised to observe for any worsening tenderness, non-reducibility or change in color  follow up as outpatient with General Surgery
mildly tender reducible umbilical hernia  no discharge, change in color  advised to observe for any worsening tenderness, non-reducibility or change in color  follow up as outpatient with General Surgery

## 2022-05-06 NOTE — CONSULT NOTE ADULT - PROBLEM SELECTOR RECOMMENDATION 9
Pt s/p ORIF left lisfranc fx pod#1  High risk medications reviewed. Avoid polypharmacy. Avoid IV opioids. Avoid NSAIDs and benzodiazepines. Non-pharmacological sleep aides initiated. Non-opioid medications and non-pharmacological pain management measures initiated.  Mild pain - pain level 1-3  nonpharmacological measures  ice packs, heat packs, PT/OOB, guided imagery, distraction   Nonopioid recc  - Acetaminophen 1 gram po q 8 hours atc for 3 days  - toradol 30mg ivp q 6 hours prn  Opioid Recc  - dc iv opioids   - oxycodone 10mg po q 6 hours prn   Bowel Regimen  - senna daily   PT

## 2022-05-06 NOTE — PROGRESS NOTE ADULT - PROBLEM SELECTOR PLAN 1
Hx of mechanical fall  XR L foot - Lisfranc fracture w/ transverse displaced fracture of 2nd MT  CT L foot - comminuted fracture at base of 2nd MT, dorsal subluxation; avulsion fracture, 1st, 3rd, 4th TMT, mild lateral subluxation, 1st MT, middle cuneiform  s/p Jeffery compression / posterior splint placed  pain meds x prn  Fall precaution  Podiatry consulted  Scheduled for ORIF today (5/5/22)  Pt consulted
Hx of mechanical fall  XR L foot - Lisfranc fracture w/ transverse displaced fracture of 2nd MT  CT L foot - comminuted fracture at base of 2nd MT, dorsal subluxation; avulsion fracture, 1st, 3rd, 4th TMT, mild lateral subluxation, 1st MT, middle cuneiform  s/p Jeffery compression / posterior splint placed  pain meds x prn  Fall precaution  Podiatry consulted  s/p for ORIF (5/5/22)  Pt consulted

## 2022-05-06 NOTE — CONSULT NOTE ADULT - SUBJECTIVE AND OBJECTIVE BOX
Patient is a 51y old  Male who presents with a chief complaint of Left Foot Pain    Podiatry HPI: 50 y/o Male presents to the ED with Left Foot Pain. Patient was seen in the Podiatry clinic where the podiatrist told him to come to the ED for surgery tomorrow for a Left Foot Fracture. 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 States the injury was over a week ago. States that it is mildly painful. Patient is amendable for surgery tomorrow. Denies any other pedal complaints. Denies any other constitutional symptoms of N/V/C/F/SOB.     PMH:  Allergies: No Known Allergies      Vital Signs Last 24 Hrs  T(C): 37.4 (04 May 2022 09:52), Max: 37.4 (04 May 2022 09:52)  T(F): 99.3 (04 May 2022 09:52), Max: 99.3 (04 May 2022 09:52)  HR: 87 (04 May 2022 09:52) (87 - 87)  BP: 132/83 (04 May 2022 09:52) (132/83 - 132/83)  BP(mean): --  RR: 18 (04 May 2022 09:52) (18 - 18)  SpO2: 98% (04 May 2022 09:52) (98% - 98%)    LABS                        14.6   6.39  )-----------( 361      ( 04 May 2022 11:59 )             41.3               05-04    140  |  106  |  13  ----------------------------<  100<H>  3.6   |  29  |  0.80    Ca    9.1      04 May 2022 11:59    TPro  7.4  /  Alb  3.9  /  TBili  0.4  /  DBili  x   /  AST  17  /  ALT  40  /  AlkPhos  71  05-04     WBC Count: 6.39 K/uL (05-04-22 @ 11:59)        PHYSICAL EXAM  GEN: EMMANUEL DICKEY is a pleasant well-nourished, well developed 51y Male in no acute distress, alert awake, and oriented to person, place and time.   LE Focused:    Vasc:  DP/PT pulses were palpable, b/l. CFT brisk to all digits.   Derm: Milld ecchmyosis noted to the medial aspect of the left foot arch. No open lesions noted or any signs of clinical infection.  Neuro: Protective sensation was intact, bilaterally.   MSK: Unable to fully asses due to Patient's left foot pain. Pain on palpation noted to the 1st metatarsal and dorsal aspect of the left foot.     Imaging: pending      
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 He has no significant medical history. 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 He broke his fall by grabbing on the railing. Initially, he just presented with limping with no pain or swelling. He started to have intermittent dull pain at night. Three days prior he started to notice increased swelling of his L foot with associated darkening of the skin overlying his toes. He still continued to work and denies any numbness, tingling sensation. When he noticed that the swelling did not resolved, he consulted ED who referred him to Podiatry. XR L foot showed a Lisfranc fracture (transverse displaced fracture at the 2nd metatarsal bone base). A Day compression / posterior split was placed. CT L foot showed comminuted fracture at the base of the 2nd metatarsal, dorsal subluxation, avulsion fracture, 1st tarsometatarsal, 3rd tarsometatarsal, 4fth tarsometatarsal mild lateral subluxation of the 1st metatarsal and middle cuneiform. He was subsequently admitted for ORIF scheduled on 5/5/2022. (04 May 2022 16:20)      Pt s/p orif Left lisfran fracture pod#1.  + left foot pain.  Pain on exertion. No nausea or vomiting.  No chest pain or sob.      PAIN SCORE:  4/10     SCALE USED: (1-10 VNRS)    PAST MEDICAL & SURGICAL HISTORY:  No pertinent past medical history    No significant past surgical history        FAMILY HISTORY:  No pertinent family history in first degree relatives          SOCIAL HISTORY:  [x ] Denies Smoking, Alcohol, or Drug Use    Allergies    No Known Allergies    Intolerances        MEDICATIONS:    MEDICATIONS  (STANDING):  acetaminophen     Tablet .. 1000 milliGRAM(s) Oral every 8 hours  enoxaparin Injectable 40 milliGRAM(s) SubCutaneous every 24 hours  polyethylene glycol 3350 17 Gram(s) Oral daily  senna 2 Tablet(s) Oral at bedtime    MEDICATIONS  (PRN):  ketorolac   Injectable 30 milliGRAM(s) IV Push every 6 hours PRN Severe Pain (7 - 10)  oxyCODONE    IR 10 milliGRAM(s) Oral every 6 hours PRN Moderate Pain (4 - 6)      Vital Signs Last 24 Hrs  T(C): 37.1 (06 May 2022 21:13), Max: 37.1 (06 May 2022 21:13)  T(F): 98.8 (06 May 2022 21:13), Max: 98.8 (06 May 2022 21:13)  HR: 78 (06 May 2022 21:13) (78 - 101)  BP: 113/70 (06 May 2022 21:13) (113/70 - 147/91)  BP(mean): --  RR: 18 (06 May 2022 21:13) (17 - 18)  SpO2: 96% (06 May 2022 21:13) (95% - 97%)    LABS:                          13.9   12.86 )-----------( 338      ( 06 May 2022 07:21 )             40.1     05-06    140  |  105  |  13  ----------------------------<  107<H>  3.9   |  28  |  0.76    Ca    9.3      06 May 2022 07:21  Phos  3.0     05-05  Mg     2.2     05-05    TPro  7.3  /  Alb  3.7  /  TBili  0.5  /  DBili  x   /  AST  18  /  ALT  37  /  AlkPhos  68  05-05    PT/INR - ( 05 May 2022 08:10 )   PT: 11.9 sec;   INR: 1.00 ratio         PTT - ( 05 May 2022 08:10 )  PTT:33.4 sec  LIVER FUNCTIONS - ( 05 May 2022 08:10 )  Alb: 3.7 g/dL / Pro: 7.3 g/dL / ALK PHOS: 68 U/L / ALT: 37 U/L DA / AST: 18 U/L / GGT: x             CAPILLARY BLOOD GLUCOSE          REVIEW OF SYSTEMS:  CONSTITUTIONAL: No fever or fatigue  RESPIRATORY: No cough, wheezing, chills or hemoptysis; No shortness of breath  CARDIOVASCULAR: No chest pain, palpitations, dizziness, or leg swelling  GASTROINTESTINAL: No abdominal or epigastric pain. No nausea, vomiting; No diarrhea or constipation.   GENITOURINARY: No dysuria, frequency, hematuria, retention or incontinence  MUSCULOSKELETAL: + left foot pain   NEURO: No weakness, no numbness   PSYCHIATRIC: No depression, anxiety, mood swings, or difficulty sleeping    PHYSICAL EXAM:  GENERAL:  Alert & Oriented X3, NAD, Good concentration  CHEST/LUNG: Clear to auscultation bilaterally; No rales, rhonchi, wheezing, or rubs  HEART: Regular rate and rhythm; No murmurs, rubs, or gallops  ABDOMEN: Soft, Nontender, Nondistended; Bowel sounds present  : no incontinence, no flank pain  EXTREMITIES:  2+ Peripheral Pulses, No cyanosis, or edema  MUSCULOSKELETAL: + left foot in cast + pain on exertion.   NEUROLOGICAL: awake and alert and oriented   SKIN: No rashes or lesions    Radiology:    Drug Screen:  enoxaparin Injectable 40 milliGRAM(s) SubCutaneous every 24 hours          ORT Score   Family Hx of substance abuse	Female	Male  Alcohol 	                                              1              3  Illegal drugs	                                      2              3  Rx drugs                                               4	      4    Personal Hx of substance abuse		  Alcohol 	                                               3	      3  Illegal drugs                                  	       4	      4  Rx drugs                                                5	      5  Age between 16- 45 years	               1             1  hx preadolescent sexual abuse	       3	      0  Psychological disease		  ADD, OCD, bipolar, schizophrenia	2	      2  Depression                                    	1	      1  Score totals 	0 	  		  a score of 3 or lower indicates low risk for opioid abuse		  a score of 4-7 indicates moderate risk for opioid abuse		  a score of 8 or higher indicates high risk for opioid abuse	    Risk factors associated with adverse outcomes related to opioid treatment  [ ]  Concurrent benzodiazepine use  [ ]  History/ Active substance use or alcohol use disorder  [ ] Psychiatric co-morbidity  [ ] Sleep apnea  [ ] COPD  [ ] BMI> 35  [ ] Liver dysfunction  [ ] Renal dysfunction  [ ] CHF  [ ] Smoker  [ ]  Age > 60 years      [ x]  [place]  Reviewed and Copied to Chart. See below.

## 2022-05-06 NOTE — PHYSICAL THERAPY INITIAL EVALUATION ADULT - RANGE OF MOTION EXAMINATION, REHAB EVAL
except for left ankle NT-able to wiggle exposed left toes/bilateral upper extremity ROM was WNL (within normal limits)/bilateral lower extremity was ROM was WNL (within normal limits)

## 2022-05-07 ENCOUNTER — TRANSCRIPTION ENCOUNTER (OUTPATIENT)
Age: 52
End: 2022-05-07

## 2022-05-07 LAB
ANION GAP SERPL CALC-SCNC: 6 MMOL/L — SIGNIFICANT CHANGE UP (ref 5–17)
BUN SERPL-MCNC: 19 MG/DL — HIGH (ref 7–18)
CALCIUM SERPL-MCNC: 9.1 MG/DL — SIGNIFICANT CHANGE UP (ref 8.4–10.5)
CHLORIDE SERPL-SCNC: 105 MMOL/L — SIGNIFICANT CHANGE UP (ref 96–108)
CO2 SERPL-SCNC: 27 MMOL/L — SIGNIFICANT CHANGE UP (ref 22–31)
CREAT SERPL-MCNC: 0.79 MG/DL — SIGNIFICANT CHANGE UP (ref 0.5–1.3)
EGFR: 108 ML/MIN/1.73M2 — SIGNIFICANT CHANGE UP
GLUCOSE SERPL-MCNC: 103 MG/DL — HIGH (ref 70–99)
HCT VFR BLD CALC: 42.2 % — SIGNIFICANT CHANGE UP (ref 39–50)
HGB BLD-MCNC: 14.4 G/DL — SIGNIFICANT CHANGE UP (ref 13–17)
MCHC RBC-ENTMCNC: 29.7 PG — SIGNIFICANT CHANGE UP (ref 27–34)
MCHC RBC-ENTMCNC: 34.1 GM/DL — SIGNIFICANT CHANGE UP (ref 32–36)
MCV RBC AUTO: 87 FL — SIGNIFICANT CHANGE UP (ref 80–100)
NRBC # BLD: 0 /100 WBCS — SIGNIFICANT CHANGE UP (ref 0–0)
PLATELET # BLD AUTO: 338 K/UL — SIGNIFICANT CHANGE UP (ref 150–400)
POTASSIUM SERPL-MCNC: 3.9 MMOL/L — SIGNIFICANT CHANGE UP (ref 3.5–5.3)
POTASSIUM SERPL-SCNC: 3.9 MMOL/L — SIGNIFICANT CHANGE UP (ref 3.5–5.3)
RBC # BLD: 4.85 M/UL — SIGNIFICANT CHANGE UP (ref 4.2–5.8)
RBC # FLD: 13.1 % — SIGNIFICANT CHANGE UP (ref 10.3–14.5)
SODIUM SERPL-SCNC: 138 MMOL/L — SIGNIFICANT CHANGE UP (ref 135–145)
WBC # BLD: 8.11 K/UL — SIGNIFICANT CHANGE UP (ref 3.8–10.5)
WBC # FLD AUTO: 8.11 K/UL — SIGNIFICANT CHANGE UP (ref 3.8–10.5)

## 2022-05-07 PROCEDURE — 99232 SBSQ HOSP IP/OBS MODERATE 35: CPT | Mod: GC

## 2022-05-07 RX ADMIN — OXYCODONE HYDROCHLORIDE 10 MILLIGRAM(S): 5 TABLET ORAL at 18:27

## 2022-05-07 RX ADMIN — OXYCODONE HYDROCHLORIDE 10 MILLIGRAM(S): 5 TABLET ORAL at 19:45

## 2022-05-07 RX ADMIN — Medication 1000 MILLIGRAM(S): at 06:54

## 2022-05-07 RX ADMIN — ENOXAPARIN SODIUM 40 MILLIGRAM(S): 100 INJECTION SUBCUTANEOUS at 21:58

## 2022-05-07 RX ADMIN — Medication 1000 MILLIGRAM(S): at 07:46

## 2022-05-07 RX ADMIN — Medication 1000 MILLIGRAM(S): at 00:17

## 2022-05-07 NOTE — DISCHARGE NOTE PROVIDER - NSDCCAREPROVSEEN_GEN_ALL_CORE_FT
Valentine, Brenton Dueñas, Gerardo Bhakta, Galilea Reyes, Tonja Farmer, Barrett Farmer, Malaika Krishnan, Jasmeet Ricci, Shaheed Krishnan, Vandana Garcia, Jayant Escobar, Vy Davis, Brady Lanier, Mayte Huizar, Lolis Espitia, Keli MOSQUEDA

## 2022-05-07 NOTE — PROGRESS NOTE ADULT - ATTENDING COMMENTS
Patient seen and examined, pain is well controlled today.   Says he is waiting to be seen by podiatry as he has a lot of questions regarding his surgery    # Left foot Lisfranc Fracture s/p ORIF  #acute postoperative pain  #stable umbilical hernia  - pain better controlled with tylenol q8hr x 3 doses  - add oxycodone 10mg IR for severe pain  - pain mgmt consulted  - oxycodone 10mg IR for severe pain  - home referral services on discharge as patient has no PMD  - f/u outpatient with Dr. Jeannie Bai  - podiatry follow up    Patient says he needs to make arrangements at home for discharge, cannot do it today likely dc tomorrow
Patient seen/evaluated at bedside on 5/6/2022. I agree with the resident progress note/outlined plan of care. My independent findings and conclusions are documented.    c/o intermittent shooting pain of left foot.    1. Left foot Lisfranc Fracture s/p ORIF  2. acute postoperative pain  3. stable umbilical hernia    primary complaint is post operative pain s/p LisFranc fracture  start acetaminophen 1000mg q 8 hours x 3 doses  add oxycodone 10mg IR + IV for severe level pain  consult pain mgt consult  my name or the name of the discharging attending can be utilized as referral name for home services as patient has no PMD  refer to new PMD, Dr. Jeannie Bai  -outpatient follow up with podiatry

## 2022-05-07 NOTE — DISCHARGE NOTE PROVIDER - NSDCPNSUBOBJ_GEN_ALL_CORE
patient pain is controlled, Podiatry addressed his concerns and answered all his questions  stable to d/c home  letter for exemption from work provided.

## 2022-05-07 NOTE — DISCHARGE NOTE PROVIDER - NSDCCPCAREPLAN_GEN_ALL_CORE_FT
PRINCIPAL DISCHARGE DIAGNOSIS  Diagnosis: Closed fracture of left foot  Assessment and Plan of Treatment: You presented to the hospital for Left foot fracture. An open reduction, internal fixation procedure was performed. You complained of pain after the procedure. Pain management was consulted. Please follow up with your orthopedic surgeon with 2 weeks of discharge.       PRINCIPAL DISCHARGE DIAGNOSIS  Diagnosis: Closed fracture of left foot  Assessment and Plan of Treatment: You presented to the hospital for Left foot fracture. An open reduction, internal fixation procedure was performed. You complained of pain after the procedure. Pain management was consulted. Please follow up with your orthopedic surgeon with 2 weeks of discharge, Dr. Pacheco.  We have sent you pain medication to your pharmacy. Please take this medication as prescribed.      SECONDARY DISCHARGE DIAGNOSES  Diagnosis: Umbilical hernia  Assessment and Plan of Treatment: Please follow up with a general surgeon outpatient for your umbilical hernia monitoring. We have provided you with a new primary care doctor, Dr. Bai. They may be able to help you find a surgeon if needed.     PRINCIPAL DISCHARGE DIAGNOSIS  Diagnosis: Closed fracture of left foot  Assessment and Plan of Treatment: You presented to the hospital for Left foot fracture. An open reduction, internal fixation procedure was performed. You complained of pain after the procedure. Pain management was consulted. Please follow up with your orthopedic surgeon with 2 weeks of discharge, Dr. Pacheco.  We have sent you pain medication to your pharmacy. Please take this medication as prescribed. Podiatry recommends that you place no weight on your leg until June 16th.      SECONDARY DISCHARGE DIAGNOSES  Diagnosis: Umbilical hernia  Assessment and Plan of Treatment: Please follow up with a general surgeon outpatient for your umbilical hernia monitoring. We have provided you with a new primary care doctor, Dr. Bai. They may be able to help you find a surgeon if needed.

## 2022-05-07 NOTE — DISCHARGE NOTE PROVIDER - PROVIDER TOKENS
PROVIDER:[TOKEN:[[id number]],FOLLOWUP:[2 weeks]],PROVIDER:[TOKEN:[[id number]],FOLLOWUP:[2 weeks]]

## 2022-05-07 NOTE — DISCHARGE NOTE PROVIDER - NSDCMRMEDTOKEN_GEN_ALL_CORE_FT
polyethylene glycol 3350 oral powder for reconstitution: 17 gram(s) orally once a day  senna oral tablet: 2 tab(s) orally once a day (at bedtime)  Tylenol Regular Strength 325 mg oral tablet: 2 tab(s) orally every 6 hours as needed for pain    polyethylene glycol 3350 oral powder for reconstitution: 17 gram(s) orally once a day  senna oral tablet: 2 tab(s) orally once a day (at bedtime)  traMADol 50 mg oral tablet: 1 tab(s) orally every 6 hours, As Needed MDD:4 tabs   Tylenol Regular Strength 325 mg oral tablet: 2 tab(s) orally every 6 hours as needed for pain

## 2022-05-07 NOTE — DISCHARGE NOTE PROVIDER - HOSPITAL COURSE
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 He has no significant medical history. 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 He broke his fall by grabbing on the railing. Initially, he just presented with limping with no pain or swelling. He started to have intermittent dull pain at night. Three days prior he started to notice increased swelling of his L foot with associated darkening of the skin overlying his toes. He still continued to work and denies any numbness, tingling sensation. When he noticed that the swelling did not resolved, he consulted ED who referred him to Podiatry. XR L foot showed a Lisfranc fracture (transverse displaced fracture at the 2nd metatarsal bone base). A Day compression / posterior split was placed. CT L foot showed comminuted fracture at the base of the 2nd metatarsal, dorsal subluxation, avulsion fracture, 1st tarsometatarsal, 3rd tarsometatarsal, 4fth tarsometatarsal mild lateral subluxation of the 1st metatarsal and middle cuneiform. He was subsequently admitted for ORIF scheduled on 5/5/2022.    Lisfranc dislocation, left, initial encounter. Hx of mechanical fall. XR L foot - Lisfranc fracture w/ transverse displaced fracture of 2nd MT. CT L foot - comminuted fracture at base of 2nd MT, dorsal subluxation; avulsion fracture, 1st, 3rd, 4th TMT, mild lateral subluxation, 1st MT, middle cuneiform. S/p Jeffery compression / posterior splint placed  pain meds x prn. Fall precaution. Podiatry consulted. S/p for ORIF (5/5/22). Pt consulted.    Umbilical hernia. Mildly tender reducible umbilical hernia. No discharge, change in color  advised to observe for any worsening tenderness, non-reducibility or change in color  follow up as outpatient with General Surgery.

## 2022-05-07 NOTE — DISCHARGE NOTE PROVIDER - CARE PROVIDER_API CALL
Adrien Pacheco (DPM)  Foot Surgery; Recon RearfootAnkle Surgery  [address] [place] [address]  Phone: [phone]  Fax: [phone]  Follow Up Time: 2 weeks    Jeannie Bai; MPH)  Internal Medicine  [address] [place] [id number]  Phone: [phone]  Fax: [phone]  Follow Up Time: 2 weeks

## 2022-05-07 NOTE — DISCHARGE NOTE PROVIDER - CARE PROVIDERS DIRECT ADDRESSES
[email or web address]

## 2022-05-08 LAB
ANION GAP SERPL CALC-SCNC: 8 MMOL/L — SIGNIFICANT CHANGE UP (ref 5–17)
BUN SERPL-MCNC: 19 MG/DL — HIGH (ref 7–18)
CALCIUM SERPL-MCNC: 9.1 MG/DL — SIGNIFICANT CHANGE UP (ref 8.4–10.5)
CHLORIDE SERPL-SCNC: 107 MMOL/L — SIGNIFICANT CHANGE UP (ref 96–108)
CO2 SERPL-SCNC: 25 MMOL/L — SIGNIFICANT CHANGE UP (ref 22–31)
CREAT SERPL-MCNC: 0.71 MG/DL — SIGNIFICANT CHANGE UP (ref 0.5–1.3)
EGFR: 111 ML/MIN/1.73M2 — SIGNIFICANT CHANGE UP
GLUCOSE SERPL-MCNC: 105 MG/DL — HIGH (ref 70–99)
HCT VFR BLD CALC: 41.7 % — SIGNIFICANT CHANGE UP (ref 39–50)
HGB BLD-MCNC: 14.4 G/DL — SIGNIFICANT CHANGE UP (ref 13–17)
MCHC RBC-ENTMCNC: 29.3 PG — SIGNIFICANT CHANGE UP (ref 27–34)
MCHC RBC-ENTMCNC: 34.5 GM/DL — SIGNIFICANT CHANGE UP (ref 32–36)
MCV RBC AUTO: 84.8 FL — SIGNIFICANT CHANGE UP (ref 80–100)
NRBC # BLD: 0 /100 WBCS — SIGNIFICANT CHANGE UP (ref 0–0)
PLATELET # BLD AUTO: 349 K/UL — SIGNIFICANT CHANGE UP (ref 150–400)
POTASSIUM SERPL-MCNC: 3.8 MMOL/L — SIGNIFICANT CHANGE UP (ref 3.5–5.3)
POTASSIUM SERPL-SCNC: 3.8 MMOL/L — SIGNIFICANT CHANGE UP (ref 3.5–5.3)
RBC # BLD: 4.92 M/UL — SIGNIFICANT CHANGE UP (ref 4.2–5.8)
RBC # FLD: 12.6 % — SIGNIFICANT CHANGE UP (ref 10.3–14.5)
SODIUM SERPL-SCNC: 140 MMOL/L — SIGNIFICANT CHANGE UP (ref 135–145)
WBC # BLD: 6.49 K/UL — SIGNIFICANT CHANGE UP (ref 3.8–10.5)
WBC # FLD AUTO: 6.49 K/UL — SIGNIFICANT CHANGE UP (ref 3.8–10.5)

## 2022-05-08 PROCEDURE — 99232 SBSQ HOSP IP/OBS MODERATE 35: CPT

## 2022-05-08 RX ORDER — ACETAMINOPHEN 500 MG
2 TABLET ORAL
Qty: 240 | Refills: 0
Start: 2022-05-08 | End: 2022-06-06

## 2022-05-08 RX ORDER — TRAMADOL HYDROCHLORIDE 50 MG/1
50 TABLET ORAL EVERY 6 HOURS
Refills: 0 | Status: DISCONTINUED | OUTPATIENT
Start: 2022-05-08 | End: 2022-05-09

## 2022-05-08 RX ORDER — POLYETHYLENE GLYCOL 3350 17 G/17G
17 POWDER, FOR SOLUTION ORAL
Qty: 510 | Refills: 0
Start: 2022-05-08 | End: 2022-06-06

## 2022-05-08 RX ORDER — TRAMADOL HYDROCHLORIDE 50 MG/1
1 TABLET ORAL
Qty: 24 | Refills: 0
Start: 2022-05-08 | End: 2022-05-13

## 2022-05-08 RX ORDER — SENNA PLUS 8.6 MG/1
2 TABLET ORAL
Qty: 60 | Refills: 0
Start: 2022-05-08 | End: 2022-06-06

## 2022-05-08 RX ADMIN — OXYCODONE HYDROCHLORIDE 10 MILLIGRAM(S): 5 TABLET ORAL at 06:45

## 2022-05-08 RX ADMIN — OXYCODONE HYDROCHLORIDE 10 MILLIGRAM(S): 5 TABLET ORAL at 07:15

## 2022-05-08 NOTE — PROGRESS NOTE ADULT - SUBJECTIVE AND OBJECTIVE BOX
PGY-1 Progress Note discussed with attending    PAGER #: [[phone]] TILL 5:00 PM  PLEASE CONTACT ON CALL TEAM:  - On Call Team (Please refer to Yoli) FROM 5:00 PM - 8:30PM  - Nightfloat Team FROM 8:30 -7:30 AM    INTERVAL HPI/OVERNIGHT EVENTS:   - Patient is complaining of 2/10 pain in his LLE     REVIEW OF SYSTEMS:  CONSTITUTIONAL: No fever, weight loss, or fatigue  RESPIRATORY: No cough, wheezing, chills or hemoptysis; No shortness of breath  CARDIOVASCULAR: No chest pain, palpitations, dizziness, or leg swelling  GASTROINTESTINAL: No abdominal pain. No nausea, vomiting, or hematemesis; No diarrhea or constipation. No melena or hematochezia.  GENITOURINARY: No dysuria or hematuria, urinary frequency  NEUROLOGICAL: No headaches, memory loss, loss of strength, numbness, or tremors  SKIN: No itching, burning, rashes, or lesions     MEDICATIONS  (STANDING):  enoxaparin Injectable 40 milliGRAM(s) SubCutaneous every 24 hours  polyethylene glycol 3350 17 Gram(s) Oral daily  senna 2 Tablet(s) Oral at bedtime    MEDICATIONS  (PRN):  ketorolac   Injectable 30 milliGRAM(s) IV Push every 6 hours PRN Severe Pain (7 - 10)  oxyCODONE    IR 10 milliGRAM(s) Oral every 6 hours PRN Moderate Pain (4 - 6)      Vital Signs Last 24 Hrs  T(C): 36.6 (07 May 2022 13:47), Max: 37.1 (06 May 2022 21:13)  T(F): 97.8 (07 May 2022 13:47), Max: 98.8 (06 May 2022 21:13)  HR: 91 (07 May 2022 13:47) (74 - 92)  BP: 130/79 (07 May 2022 13:47) (113/70 - 144/88)  BP(mean): 96 (07 May 2022 13:47) (96 - 96)  RR: 18 (07 May 2022 13:47) (18 - 18)  SpO2: 95% (07 May 2022 13:47) (95% - 97%)    PHYSICAL EXAMINATION:  GENERAL: NAD, AAOx  HEAD: AT/NC  EYES: conjunctiva and sclera clear  NECK: supple, No JVD noted, Normal thyroid  CHEST/LUNG: CTABL; no rales, rhonchi, wheezing, or rubs  HEART: regular rate and rhythm; no murmurs, rubs, or gallops  ABDOMEN: soft, nontender, nondistended; Bowel sounds present  EXTREMITIES:  2+ Peripheral Pulses, No clubbing, cyanosis, or edema  SKIN: warm dry                          14.4   8.11  )-----------( 338      ( 07 May 2022 06:47 )             42.2     05-07    138  |  105  |  19<H>  ----------------------------<  103<H>  3.9   |  27  |  0.79    Ca    9.1      07 May 2022 06:47              COVID-19 PCR: NotDetec (04 May 2022 11:59)      CAPILLARY BLOOD GLUCOSE          RADIOLOGY & ADDITIONAL TESTS:                  
Patient is a 51y old  Male who presents with a chief complaint of L foot pain 2/2 fracture (07 May 2022 15:33)      SUBJECTIVE / OVERNIGHT EVENTS: Patient remains medically cleared for discharge, explained to patient but he refused. Requesting speaking with podaitry and physical therapy again.  After podiatry and PT saw patient, says he is not ready to leave today    ADDITIONAL REVIEW OF SYSTEMS: negative as per above    MEDICATIONS  (STANDING):  enoxaparin Injectable 40 milliGRAM(s) SubCutaneous every 24 hours  polyethylene glycol 3350 17 Gram(s) Oral daily  senna 2 Tablet(s) Oral at bedtime    MEDICATIONS  (PRN):  ketorolac   Injectable 30 milliGRAM(s) IV Push every 6 hours PRN Severe Pain (7 - 10)  oxyCODONE    IR 10 milliGRAM(s) Oral every 6 hours PRN Moderate Pain (4 - 6)      CAPILLARY BLOOD GLUCOSE        I&O's Summary      PHYSICAL EXAM:  Vital Signs Last 24 Hrs  T(C): 36.6 (08 May 2022 14:50), Max: 36.7 (07 May 2022 20:49)  T(F): 97.9 (08 May 2022 14:50), Max: 98.1 (07 May 2022 20:49)  HR: 94 (08 May 2022 15:45) (78 - 94)  BP: 113/76 (08 May 2022 15:45) (113/76 - 126/79)  BP(mean): 89 (07 May 2022 20:49) (89 - 89)  RR: 18 (08 May 2022 14:50) (18 - 18)  SpO2: 95% (08 May 2022 15:45) (95% - 97%)    GENERAL: NAD,   HEAD:  Atraumatic  CHEST/LUNG:no respiratory distress  ABDOMEN: Soft,   EXTREMITIES: L foot with ace wrap bandage c/d/i  PSYCH: AAOx3       LABS:                        14.4   6.49  )-----------( 349      ( 08 May 2022 06:19 )             41.7     05-08    140  |  107  |  19<H>  ----------------------------<  105<H>  3.8   |  25  |  0.71    Ca    9.1      08 May 2022 06:19                Trend Procalcitonin          D-Dimer:      RADIOLOGY & ADDITIONAL TESTS:  Results Reviewed:   Imaging Personally Reviewed:  Electrocardiogram Personally Reviewed:    COORDINATION OF CARE:  Care Discussed with Consultants/Other Providers [Y/N]:  Prior or Outpatient Records Reviewed [Y/N]:  
Podiatry interval: Pt seen resting in bed in no acute distress. Pt admits to pain to the surgical site. Denies any other pedal complaints.     Podiatry HPI: 52 y/o Male presents to the ED with Left Foot Pain. Patient was seen in the Podiatry clinic where the podiatrist told him to come to the ED for surgery tomorrow for a Left Foot Fracture. 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 States the injury was over a week ago. States that it is mildly painful. Patient is amendable for surgery tomorrow. Denies any other pedal complaints. Denies any other constitutional symptoms of N/V/C/F/SOB.       PMH:  =====================================  Allergies: No Known Allergies  =========================================  Medications acetaminophen     Tablet .. 325 milliGRAM(s) Oral every 4 hours PRN  morphine  - Injectable 1 milliGRAM(s) IV Push every 4 hours PRN  oxyCODONE    IR 10 milliGRAM(s) Oral every 6 hours PRN    FHNo pertinent family history in first degree relatives    ,   PMHNo pertinent past medical history       PSHNo significant past surgical history        Labs                          13.9   12.86 )-----------( 338      ( 06 May 2022 07:21 )             40.1      05-06    140  |  105  |  13  ----------------------------<  107<H>  3.9   |  28  |  0.76    Ca    9.3      06 May 2022 07:21  Phos  3.0     05-05  Mg     2.2     05-05    TPro  7.3  /  Alb  3.7  /  TBili  0.5  /  DBili  x   /  AST  18  /  ALT  37  /  AlkPhos  68  05-05     Vital Signs Last 24 Hrs  T(C): 36.5 (06 May 2022 05:09), Max: 37.2 (05 May 2022 18:15)  T(F): 97.7 (06 May 2022 05:09), Max: 99 (05 May 2022 18:15)  HR: 98 (06 May 2022 05:09) (82 - 100)  BP: 133/88 (06 May 2022 05:09) (115/73 - 151/89)  BP(mean): 98 (05 May 2022 19:20) (86 - 100)  RR: 18 (06 May 2022 05:09) (14 - 20)  SpO2: 97% (06 May 2022 05:09) (97% - 100%)         WBC Count: 12.86 K/uL *H* (05-06-22 @ 07:21)  WBC Count: 6.39 K/uL (05-04-22 @ 11:59)      PHYSICAL EXAM  GEN: EMMANUEL DICKEY is a pleasant well-nourished, well developed 51y Male in no acute distress, alert awake, and oriented to person, place and time.   LE Focused:    Vasc:  DP/PT pulses were palpable, b/l. CFT brisk to all digits.   Derm: Milld ecchmyosis noted to the medial aspect of the left foot arch. No open lesions noted or any signs of clinical infection.  Neuro: Protective sensation was intact, bilaterally.   MSK: Unable to fully asses due to Patient's left foot pain. Pain on palpation noted to the 1st metatarsal and dorsal aspect of the left foot.     5/6/22: Surgical site is intact with no dehiscence noted.       Imaging:    ACC: [id number] EXAM:  XR FOOT COMP MIN 3 VIEWS LT                          PROCEDURE DATE:  05/04/2022      INTERPRETATION:  Clinical history: 51-year-old male, preop surgical   x-rays.    Three views of the left foot in an immobilization deviceare correlated   with the CT of 4/25/2022.    FINDINGS: Lisfranc fracture with avulsion fractures at the   tarsometatarsal joints, unchanged.    IMPRESSION:  Lisfranc fracture/injury with avulsion fractures at the tarsometatarsal   joints, grossly unchanged            
Podiatry interval: Pt seen resting in bed in no acute distress. Pt is amendable for surgery today. Pt has been NPO since last midnight.     Podiatry HPI: 52 y/o Male presents to the ED with Left Foot Pain. Patient was seen in the Podiatry clinic where the podiatrist told him to come to the ED for surgery tomorrow for a Left Foot Fracture. 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 States the injury was over a week ago. States that it is mildly painful. Patient is amendable for surgery tomorrow. Denies any other pedal complaints. Denies any other constitutional symptoms of N/V/C/F/SOB.       PMH:    Allergies: No Known Allergies    Vital Signs Last 24 Hrs  T(C): 37.4 (04 May 2022 09:52), Max: 37.4 (04 May 2022 09:52)  T(F): 99.3 (04 May 2022 09:52), Max: 99.3 (04 May 2022 09:52)  HR: 87 (04 May 2022 09:52) (87 - 87)  BP: 132/83 (04 May 2022 09:52) (132/83 - 132/83)  BP(mean): --  RR: 18 (04 May 2022 09:52) (18 - 18)  SpO2: 98% (04 May 2022 09:52) (98% - 98%)    LABS                        14.6   6.39  )-----------( 361      ( 04 May 2022 11:59 )             41.3               05-04    140  |  106  |  13  ----------------------------<  100<H>  3.6   |  29  |  0.80    Ca    9.1      04 May 2022 11:59    TPro  7.4  /  Alb  3.9  /  TBili  0.4  /  DBili  x   /  AST  17  /  ALT  40  /  AlkPhos  71  05-04  WBC Count: 6.39 K/uL (05-04-22 @ 11:59)      PHYSICAL EXAM  GEN: EMMANUEL DICKEY is a pleasant well-nourished, well developed 51y Male in no acute distress, alert awake, and oriented to person, place and time.   LE Focused:    Vasc:  DP/PT pulses were palpable, b/l. CFT brisk to all digits.   Derm: Milld ecchmyosis noted to the medial aspect of the left foot arch. No open lesions noted or any signs of clinical infection.  Neuro: Protective sensation was intact, bilaterally.   MSK: Unable to fully asses due to Patient's left foot pain. Pain on palpation noted to the 1st metatarsal and dorsal aspect of the left foot.         Imaging:    ACC: [id number] EXAM:  XR FOOT COMP MIN 3 VIEWS LT                          PROCEDURE DATE:  05/04/2022      INTERPRETATION:  Clinical history: 51-year-old male, preop surgical   x-rays.    Three views of the left foot in an immobilization deviceare correlated   with the CT of 4/25/2022.    FINDINGS: Lisfranc fracture with avulsion fractures at the   tarsometatarsal joints, unchanged.    IMPRESSION:  Lisfranc fracture/injury with avulsion fractures at the tarsometatarsal   joints, grossly unchanged            
PGY-1 Progress Note discussed with attending    PAGER #: [[phone]] TILL 5:00 PM  PLEASE CONTACT ON CALL TEAM:  - On Call Team (Please refer to Yoli) FROM 5:00 PM - 8:30PM  - Nightfloat Team FROM 8:30 -7:30 AM    INTERVAL HPI/OVERNIGHT EVENTS:   - Patient is complaining of 5-6/10 pain, constant, cannot characterize the pain as sharp or dull. Furthermore, patient complains of hunger.      REVIEW OF SYSTEMS:  as stated above    MEDICATIONS  (STANDING):    MEDICATIONS  (PRN):  acetaminophen     Tablet .. 325 milliGRAM(s) Oral every 4 hours PRN Mild Pain (1 - 3)  morphine  - Injectable 1 milliGRAM(s) IV Push every 4 hours PRN Severe Pain (7 - 10)  oxyCODONE    IR 10 milliGRAM(s) Oral every 6 hours PRN Moderate Pain (4 - 6)      Vital Signs Last 24 Hrs  T(C): 36.6 (06 May 2022 14:33), Max: 37.2 (05 May 2022 18:15)  T(F): 97.9 (06 May 2022 14:33), Max: 99 (05 May 2022 18:15)  HR: 101 (06 May 2022 14:33) (82 - 101)  BP: 147/91 (06 May 2022 14:33) (115/73 - 151/89)  BP(mean): 98 (05 May 2022 19:20) (86 - 100)  RR: 17 (06 May 2022 14:33) (14 - 20)  SpO2: 95% (06 May 2022 14:33) (95% - 98%)    PHYSICAL EXAMINATION:  GENERAL: NAD, AAOx3  HEAD: AT/NC  EYES: conjunctiva and sclera clear  NECK: supple, No JVD noted, Normal thyroid  CHEST/LUNG: CTABL; no rales, rhonchi, wheezing, or rubs  HEART: regular rate and rhythm; no murmurs, rubs, or gallops  ABDOMEN: soft, nontender, nondistended; Bowel sounds present  EXTREMITIES:  left foot bandaged  SKIN: warm dry                          13.9   12.86 )-----------( 338      ( 06 May 2022 07:21 )             40.1     05-06    140  |  105  |  13  ----------------------------<  107<H>  3.9   |  28  |  0.76    Ca    9.3      06 May 2022 07:21  Phos  3.0     05-05  Mg     2.2     05-05    TPro  7.3  /  Alb  3.7  /  TBili  0.5  /  DBili  x   /  AST  18  /  ALT  37  /  AlkPhos  68  05-05    LIVER FUNCTIONS - ( 05 May 2022 08:10 )  Alb: 3.7 g/dL / Pro: 7.3 g/dL / ALK PHOS: 68 U/L / ALT: 37 U/L DA / AST: 18 U/L / GGT: x               PT/INR - ( 05 May 2022 08:10 )   PT: 11.9 sec;   INR: 1.00 ratio         PTT - ( 05 May 2022 08:10 )  PTT:33.4 sec  COVID-19 PCR: NotDetec (04 May 2022 11:59)      CAPILLARY BLOOD GLUCOSE          RADIOLOGY & ADDITIONAL TESTS:                  
PGY-1 Progress Note discussed with attending    PAGER #: [[phone]] TILL 5:00 PM  PLEASE CONTACT ON CALL TEAM:  - On Call Team (Please refer to Yoli) FROM 5:00 PM - 8:30PM  - Nightfloat Team FROM 8:30 -7:30 AM    INTERVAL HPI/OVERNIGHT EVENTS:   - Patient denies all symptoms including pain in his foot. Patient states he is eager for surgery.      REVIEW OF SYSTEMS:  CONSTITUTIONAL: No fever, weight loss, or fatigue  RESPIRATORY: No cough, wheezing, chills or hemoptysis; No shortness of breath  CARDIOVASCULAR: No chest pain, palpitations, dizziness, or leg swelling  GASTROINTESTINAL: No abdominal pain. No nausea, vomiting, or hematemesis; No diarrhea or constipation. No melena or hematochezia.  GENITOURINARY: No dysuria or hematuria, urinary frequency  NEUROLOGICAL: No headaches, memory loss, loss of strength, numbness, or tremors  SKIN: No itching, burning, rashes, or lesions     MEDICATIONS  (STANDING):    MEDICATIONS  (PRN):      Vital Signs Last 24 Hrs  T(C): 36.9 (05 May 2022 13:17), Max: 36.9 (05 May 2022 13:17)  T(F): 98.5 (05 May 2022 13:17), Max: 98.5 (05 May 2022 13:17)  HR: 84 (05 May 2022 13:17) (84 - 92)  BP: 143/79 (05 May 2022 13:17) (143/79 - 157/77)  BP(mean): --  RR: 17 (05 May 2022 13:17) (16 - 18)  SpO2: 100% (05 May 2022 13:17) (97% - 100%)    PHYSICAL EXAMINATION:  GENERAL: NAD, AAOx1  HEAD: AT/NC  EYES: conjunctiva and sclera clear  NECK: supple, No JVD noted, Normal thyroid  CHEST/LUNG: CTABL; no rales, rhonchi, wheezing, or rubs  HEART: regular rate and rhythm; no murmurs, rubs, or gallops  ABDOMEN: soft, nontender, nondistended; Bowel sounds present  EXTREMITIES:  Left foot and LLE bandaged; No pitting edema  SKIN: warm dry                          14.6   6.71  )-----------( 371      ( 05 May 2022 08:10 )             41.9     05-05    138  |  104  |  15  ----------------------------<  108<H>  3.6   |  26  |  0.78    Ca    9.4      05 May 2022 08:10  Phos  3.0     05-05  Mg     2.2     05-05    TPro  7.3  /  Alb  3.7  /  TBili  0.5  /  DBili  x   /  AST  18  /  ALT  37  /  AlkPhos  68  05-05    LIVER FUNCTIONS - ( 05 May 2022 08:10 )  Alb: 3.7 g/dL / Pro: 7.3 g/dL / ALK PHOS: 68 U/L / ALT: 37 U/L DA / AST: 18 U/L / GGT: x               PT/INR - ( 05 May 2022 08:10 )   PT: 11.9 sec;   INR: 1.00 ratio         PTT - ( 05 May 2022 08:10 )  PTT:33.4 sec  COVID-19 PCR: NotDetec (04 May 2022 11:59)      CAPILLARY BLOOD GLUCOSE          RADIOLOGY & ADDITIONAL TESTS:

## 2022-05-08 NOTE — PROGRESS NOTE ADULT - ASSESSMENT
A:   Left Foot Lisfranc Fracture    P:  -Pt seen and evaluated  -Pt amendable for surgery today   -A written consent obtained and signed by pt with witness present  -Scheduled for ORIF of left foot lisfranc injury at 2:30 PM today with Dr. Pacheco  -Appreciate medical optimization  -Pt has been NPO since last midnight  -Covid neg  -Pt will need PT eval after sx  -D/w attending Dr. Pacheco     
A:   Left Foot Lisfranc Fracture  s/p ORIF Lisfranc Fracture    P:  -Pt seen and evaluated  -Explained clinical findings to the patient  -Dressed patient with adaptic, gauze, abd, kerlix, Webril, cárdenas compression with posterior splint.   -Patient to be NWB to the left foot in order to help the healing process.   -Appreciate PT reccs.   -Patient to follow up at [address] [place] [address] [address]nd floor [address] [id number] after discharge  -D/w attending Dr. Pacheco and seen bedside with attending Dr. Moises PEREIRAO: adaptic on surgical site, gauze, abd, kerlix, webril, cárdenas compression and posterior splint         
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 He has no significant medical history. Admitted for medical optimization for ORIF for L foot fracture scheduled on 5/5/2022.
  A/P    # Left foot Lisfranc Fracture s/p ORIF  #acute postoperative pain  #stable umbilical hernia  - pain better controlled with tylenol q8hr x 3 doses  - patient says he feels dizzy with oxycodone, will dc and add tramdol  - pain mgmt consulted, appreciate help   - home referral services on discharge as patient has no PMD  - f/u outpatient with Dr. Jeannie Bia  - podiatry follow up  - patient was seen again by podiatry and PT     Patient remains medically cleared for discharge, 24 hour discharge notice given. Patient says he is not ready for discharge home today   
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 He has no significant medical history. Admitted for medical optimization for ORIF for L foot fracture scheduled on 5/5/2022.

## 2022-05-08 NOTE — PROGRESS NOTE ADULT - REASON FOR ADMISSION
L foot pain 2/2 fracture

## 2022-05-09 ENCOUNTER — TRANSCRIPTION ENCOUNTER (OUTPATIENT)
Age: 52
End: 2022-05-09

## 2022-05-09 VITALS
OXYGEN SATURATION: 97 % | HEART RATE: 75 BPM | SYSTOLIC BLOOD PRESSURE: 115 MMHG | TEMPERATURE: 98 F | RESPIRATION RATE: 18 BRPM | DIASTOLIC BLOOD PRESSURE: 75 MMHG

## 2022-05-09 PROCEDURE — 99239 HOSP IP/OBS DSCHRG MGMT >30: CPT | Mod: GC

## 2022-05-09 NOTE — CHART NOTE - NSCHARTNOTEFT_GEN_A_CORE
Patient was seen bedside in the morning in no acute distress. Informed patient to follow up with Dr. Pacheco. Gave patient the address to go to and to be NWB. Patient is stable for discharge.
Spoke to Mr. Nguyen in detail about the type of surgery he had and what he must do now in order help the healing process. Patient understood. Patient to follow up with Dr. Pacheco after discharge. Overall patient is doing well and states that the pain has been decreased to a 2/10. The cárdenas and posterior splint was put on again. Denies any other pedal complaints. Denies any constitituonal symptoms symptoms of N/V/F/SOB.
Patient is stable for discharge. Changed his dressings and splint today with Dr. Krishnan bedside with me. Patient must follow up at [address] [facility] [address]nd floor [address] [place], [place] [address]. Call [phone] for a appointment. No wound care orders needed at this time but patient follow up in clinic this week either tuesday, wednesday, and thursday which is where we will do the dressing change

## 2022-05-09 NOTE — DISCHARGE NOTE NURSING/CASE MANAGEMENT/SOCIAL WORK - NSDCPEFALRISK_GEN_ALL_CORE
For information on Fall & Injury Prevention, visit: [email or web address] OR  [email or web address] OR  https://www.cdc.gov/steadi/patient.html

## 2022-05-09 NOTE — DISCHARGE NOTE NURSING/CASE MANAGEMENT/SOCIAL WORK - PATIENT PORTAL LINK FT
You can access the FollowMyHealth Patient Portal offered by [facility] by registering at the following website: [email or web address]. By joining [facility]’s FollowMyHealth portal, you will also be able to view your health information using other applications (apps) compatible with our system.

## 2022-05-30 PROCEDURE — 71045 X-RAY EXAM CHEST 1 VIEW: CPT

## 2022-05-30 PROCEDURE — 87635 SARS-COV-2 COVID-19 AMP PRB: CPT

## 2022-05-30 PROCEDURE — 80053 COMPREHEN METABOLIC PANEL: CPT

## 2022-05-30 PROCEDURE — 80048 BASIC METABOLIC PNL TOTAL CA: CPT

## 2022-05-30 PROCEDURE — 85730 THROMBOPLASTIN TIME PARTIAL: CPT

## 2022-05-30 PROCEDURE — 85025 COMPLETE CBC W/AUTO DIFF WBC: CPT

## 2022-05-30 PROCEDURE — 97530 THERAPEUTIC ACTIVITIES: CPT

## 2022-05-30 PROCEDURE — 99285 EMERGENCY DEPT VISIT HI MDM: CPT | Mod: 25

## 2022-05-30 PROCEDURE — 86850 RBC ANTIBODY SCREEN: CPT

## 2022-05-30 PROCEDURE — 83735 ASSAY OF MAGNESIUM: CPT

## 2022-05-30 PROCEDURE — 97162 PT EVAL MOD COMPLEX 30 MIN: CPT

## 2022-05-30 PROCEDURE — 97116 GAIT TRAINING THERAPY: CPT

## 2022-05-30 PROCEDURE — 36415 COLL VENOUS BLD VENIPUNCTURE: CPT

## 2022-05-30 PROCEDURE — 93005 ELECTROCARDIOGRAM TRACING: CPT

## 2022-05-30 PROCEDURE — 86900 BLOOD TYPING SEROLOGIC ABO: CPT

## 2022-05-30 PROCEDURE — 85610 PROTHROMBIN TIME: CPT

## 2022-05-30 PROCEDURE — 83690 ASSAY OF LIPASE: CPT

## 2022-05-30 PROCEDURE — 73630 X-RAY EXAM OF FOOT: CPT

## 2022-05-30 PROCEDURE — 84100 ASSAY OF PHOSPHORUS: CPT

## 2022-05-30 PROCEDURE — C1713: CPT

## 2022-05-30 PROCEDURE — 86901 BLOOD TYPING SEROLOGIC RH(D): CPT

## 2022-05-30 PROCEDURE — 85027 COMPLETE CBC AUTOMATED: CPT

## 2023-02-28 NOTE — ED PROCEDURE NOTE - CPROC ED TIME OUT STATEMENT1
If you have any questions regarding your visit, Please contact your care team.     [facility]: [phone]  To Schedule an Appointment 24/7  Call: [phone]LEPHONE [facility]     Samantha Wasserman- APRN CNP      Lay Gleason-Surgery Scheduler  Nathaly-Surgery Scheduler         Monday 7:30 am-5:00 pm    Tuesday 8:00 am-4:00 pm    Wednesday 7:30 am-4:00 pm  [place]    Thursday 8:00 am-11:00 am    Friday 7:30 am-4:00 pm [address] [address] [phone] ask for [facility]  [phone] Fax    Imaging Scheduling all locations  [phone]    [facility] Labor and Delivery  [address] [place] [address]   [place], [place] [id number] [phone]         Urgent Care locations:  [facility]   Monday-Friday  10 am - 8 pm  Saturday and Sunday   9 am - 5 pm     [phone]   If you need a medication refill, please contact your pharmacy. Please allow 3 business days for your refill to be completed.  As always, Thank you for trusting us with your healthcare needs!      see additional instructions from your care team below    
“Patient's name, , procedure and correct site were confirmed during the [place] Timeout.”

## 2023-11-27 NOTE — ED PROCEDURE NOTE - NS ED PROC PERFORMED BY1 FT
Patient presents to ED with children with concerns for AMS. ptis normally axo 4, independent.   Per daughter pt LKW Friday around 10 PM. Daughter states yesterday pt was axo to self.   Pt endorses flu- like s/s, unable to elaborate.   Denies fever/chills  No unilateral weakness/drift  Family concerned for possible OD d/t history of OD. Pt denies.   Hx of seizures as well  
Arin Cohen, FNP-BC

## 2024-12-18 NOTE — PROGRESS NOTE ADULT - PROVIDER SPECIALTY LIST ADULT
Internal Medicine
Podiatry
Podiatry
Hospitalist
Internal Medicine
Internal Medicine
Patient's twin sister passed this past week  - holistic nurse, DANIE  - on DC, provide info for counseling/[facility]reve[facility]  - c/w home sertraline 100mg qd

## 2025-03-30 NOTE — ED ADULT NURSE NOTE - BREATHING, MLM
03/30/25 1717 03/30/25 1830 03/30/25 1945   BP: (!) 140/91     Pulse: 84 71    Resp: 16 15 16   Temp: 97.6 °F (36.4 °C)     SpO2: 97% 98%    Weight: 102.5 kg (226 lb)           PROCEDURES:  Unless otherwise notedbelow, none  Procedures    EKG: All EKG's are interpreted by the Emergency Department Physician who either signs or Co-signs this chart in the absence of a cardiologist.      MDM      Still image from ultrasound showing vitreous detachment     ED Course as of 03/30/25 1959   Sun Mar 30, 2025   1727 Bedside ultrasound performed and appears to show a vitreous detachment in the left eye with swelling with eye movement.  No obvious associated hemorrhage [ELIECER]      ED Course User Index  [ELIECER] Prasanth Block Jr., MD     CT of the head and CTA head and neck showed no significant acute intracranial pathology.  Low suspicion for stroke and seems less likely to be amaurosis fugax based on history and ultrasound findings.  Patient reports improvement in vision while in the emergency department.  [place] stable for discharge with outpatient ophthalmology follow-up    Evaluation and work-up here revealed no signs of emergent or life-threatening pathology that would necessitate admission for further work-up or management at this time.  Patient is felt to be stable for discharge home with return precautions for worsening of the condition or development of new concerning symptoms.  Patient was encouraged to follow-up with their primary care doctor in the appropriate timeframe.  Necessary prescriptions and information have been provided for treatment at home.  Patient voices understanding and agreement with the plan.    CONSULTS:  None        FINAL IMPRESSION     1. Acute loss of vision, left    2. Posterior vitreous detachment of left eye    3. On continuous oral anticoagulation    4. Chronic atrial fibrillation (HCC)          DISPOSITION/PLAN   DISPOSITION Decision To Discharge 03/30/2025 07:21:29 PM   DISPOSITION CONDITION 
Spontaneous, unlabored and symmetrical

## (undated) DEVICE — DRILL BIT WRIGHT MEDICAL 2.6MM

## (undated) DEVICE — ELCTR GROUNDING PAD ADULT COVIDIEN

## (undated) DEVICE — DRSG 4X4

## (undated) DEVICE — BLANKET WARMER UPPER ADULT

## (undated) DEVICE — DRSG STOCKINETTE TUBULAR COTTON 2PLY 6X60"

## (undated) DEVICE — DRAPE C ARM UNIVERSAL

## (undated) DEVICE — FOR-ESU VALLEYLAB T7E14999DX: Type: DURABLE MEDICAL EQUIPMENT

## (undated) DEVICE — PREP BETADINE SPONGE STICKS

## (undated) DEVICE — BLADE SURGICAL #15 CARBON

## (undated) DEVICE — DRSG KLING 4"

## (undated) DEVICE — PACK MINOR NO DRAPE

## (undated) DEVICE — FOR-TOURNIQUET 1130808443: Type: DURABLE MEDICAL EQUIPMENT

## (undated) DEVICE — WRAP COMPRESSION CALF MED

## (undated) DEVICE — DRAPE LIGHT HANDLE COVER BLUE

## (undated) DEVICE — DRAPE HALF SHEET 40X57"

## (undated) DEVICE — SOL IRR POUR NS 0.9% 1500ML

## (undated) DEVICE — DRAPE EXTREMITY 87X128.5"

## (undated) DEVICE — DRAPE TOWEL BLUE 17" X 24"

## (undated) DEVICE — Device

## (undated) DEVICE — DRSG ACE BANDAGE 6"

## (undated) DEVICE — DRSG ESMARK 4"

## (undated) DEVICE — DRAPE U POLY BLUE 60X72"

## (undated) DEVICE — GOWN XL

## (undated) DEVICE — DRSG ADAPTIC 3X8"